# Patient Record
Sex: MALE | Race: WHITE | Employment: OTHER | URBAN - METROPOLITAN AREA
[De-identification: names, ages, dates, MRNs, and addresses within clinical notes are randomized per-mention and may not be internally consistent; named-entity substitution may affect disease eponyms.]

---

## 2019-08-30 ENCOUNTER — TELEPHONE (OUTPATIENT)
Dept: CASE MANAGEMENT | Age: 69
End: 2019-08-30

## 2019-12-19 PROBLEM — R00.1 SINUS BRADYCARDIA: Status: ACTIVE | Noted: 2019-12-19

## 2019-12-19 PROBLEM — M51.36 DDD (DEGENERATIVE DISC DISEASE), LUMBAR: Status: ACTIVE | Noted: 2019-12-19

## 2019-12-19 PROBLEM — E66.09 CLASS 1 OBESITY DUE TO EXCESS CALORIES WITH SERIOUS COMORBIDITY AND BODY MASS INDEX (BMI) OF 32.0 TO 32.9 IN ADULT: Status: ACTIVE | Noted: 2019-12-19

## 2020-11-19 PROBLEM — M79.621 PAIN IN BOTH UPPER ARMS: Status: ACTIVE | Noted: 2020-11-19

## 2020-11-19 PROBLEM — M79.622 PAIN IN BOTH UPPER ARMS: Status: ACTIVE | Noted: 2020-11-19

## 2021-05-24 ENCOUNTER — HOSPITAL ENCOUNTER (OUTPATIENT)
Dept: LAB | Age: 71
Discharge: HOME | End: 2021-05-24

## 2021-05-24 PROCEDURE — 88305 TISSUE EXAM BY PATHOLOGIST: CPT

## 2021-09-15 PROBLEM — I35.0 NONRHEUMATIC AORTIC VALVE STENOSIS: Status: ACTIVE | Noted: 2021-09-15

## 2021-11-23 PROBLEM — M25.511 CHRONIC PAIN OF BOTH SHOULDERS: Status: ACTIVE | Noted: 2021-11-23

## 2021-11-23 PROBLEM — M25.512 CHRONIC PAIN OF BOTH SHOULDERS: Status: ACTIVE | Noted: 2021-11-23

## 2021-11-23 PROBLEM — G89.29 CHRONIC PAIN OF BOTH SHOULDERS: Status: ACTIVE | Noted: 2021-11-23

## 2022-03-14 PROBLEM — F41.8 SITUATIONAL ANXIETY: Status: ACTIVE | Noted: 2022-03-14

## 2022-03-14 PROBLEM — F51.02 ADJUSTMENT INSOMNIA: Status: ACTIVE | Noted: 2022-03-14

## 2022-03-14 PROBLEM — I35.8 NONRHEUMATIC AORTIC VALVE SCLEROSIS: Status: ACTIVE | Noted: 2022-03-14

## 2022-03-14 PROBLEM — F43.9 STRESS: Status: ACTIVE | Noted: 2022-03-14

## 2022-03-14 PROBLEM — D48.5 NEOPLASM OF UNCERTAIN BEHAVIOR OF SKIN OF FACE: Status: ACTIVE | Noted: 2022-03-14

## 2022-03-18 PROBLEM — E66.811 CLASS 1 OBESITY DUE TO EXCESS CALORIES WITH SERIOUS COMORBIDITY AND BODY MASS INDEX (BMI) OF 32.0 TO 32.9 IN ADULT: Status: ACTIVE | Noted: 2019-12-19

## 2022-03-18 PROBLEM — M51.36 DDD (DEGENERATIVE DISC DISEASE), LUMBAR: Status: ACTIVE | Noted: 2019-12-19

## 2022-03-18 PROBLEM — I35.0 NONRHEUMATIC AORTIC VALVE STENOSIS: Status: ACTIVE | Noted: 2021-09-15

## 2022-03-18 PROBLEM — F41.8 SITUATIONAL ANXIETY: Status: ACTIVE | Noted: 2022-03-14

## 2022-03-18 PROBLEM — M51.369 DDD (DEGENERATIVE DISC DISEASE), LUMBAR: Status: ACTIVE | Noted: 2019-12-19

## 2022-03-18 PROBLEM — E66.09 CLASS 1 OBESITY DUE TO EXCESS CALORIES WITH SERIOUS COMORBIDITY AND BODY MASS INDEX (BMI) OF 32.0 TO 32.9 IN ADULT: Status: ACTIVE | Noted: 2019-12-19

## 2022-03-18 PROBLEM — D48.5 NEOPLASM OF UNCERTAIN BEHAVIOR OF SKIN OF FACE: Status: ACTIVE | Noted: 2022-03-14

## 2022-03-19 PROBLEM — F43.9 STRESS: Status: ACTIVE | Noted: 2022-03-14

## 2022-03-19 PROBLEM — R00.1 SINUS BRADYCARDIA: Status: ACTIVE | Noted: 2019-12-19

## 2022-03-19 PROBLEM — M25.511 CHRONIC PAIN OF BOTH SHOULDERS: Status: ACTIVE | Noted: 2021-11-23

## 2022-03-19 PROBLEM — M79.622 PAIN IN BOTH UPPER ARMS: Status: ACTIVE | Noted: 2020-11-19

## 2022-03-19 PROBLEM — G89.29 CHRONIC PAIN OF BOTH SHOULDERS: Status: ACTIVE | Noted: 2021-11-23

## 2022-03-19 PROBLEM — M25.512 CHRONIC PAIN OF BOTH SHOULDERS: Status: ACTIVE | Noted: 2021-11-23

## 2022-03-19 PROBLEM — F51.02 ADJUSTMENT INSOMNIA: Status: ACTIVE | Noted: 2022-03-14

## 2022-03-19 PROBLEM — M79.621 PAIN IN BOTH UPPER ARMS: Status: ACTIVE | Noted: 2020-11-19

## 2022-03-19 PROBLEM — I35.8 NONRHEUMATIC AORTIC VALVE SCLEROSIS: Status: ACTIVE | Noted: 2022-03-14

## 2022-06-14 ENCOUNTER — TELEPHONE (OUTPATIENT)
Dept: FAMILY MEDICINE CLINIC | Facility: CLINIC | Age: 72
End: 2022-06-14

## 2022-06-14 ENCOUNTER — OFFICE VISIT (OUTPATIENT)
Dept: FAMILY MEDICINE CLINIC | Facility: CLINIC | Age: 72
End: 2022-06-14
Payer: MEDICARE

## 2022-06-14 VITALS
OXYGEN SATURATION: 98 % | TEMPERATURE: 97.8 F | BODY MASS INDEX: 33.04 KG/M2 | SYSTOLIC BLOOD PRESSURE: 153 MMHG | HEART RATE: 68 BPM | WEIGHT: 223.1 LBS | DIASTOLIC BLOOD PRESSURE: 77 MMHG | RESPIRATION RATE: 18 BRPM | HEIGHT: 69 IN

## 2022-06-14 DIAGNOSIS — M54.50 ACUTE MIDLINE LOW BACK PAIN WITHOUT SCIATICA: ICD-10-CM

## 2022-06-14 DIAGNOSIS — F43.9 STRESS: ICD-10-CM

## 2022-06-14 DIAGNOSIS — R09.89 LABILE HYPERTENSION: Primary | ICD-10-CM

## 2022-06-14 DIAGNOSIS — I25.10 ATHEROSCLEROSIS OF NATIVE CORONARY ARTERY OF NATIVE HEART WITHOUT ANGINA PECTORIS: ICD-10-CM

## 2022-06-14 DIAGNOSIS — I35.0 NONRHEUMATIC AORTIC VALVE STENOSIS: ICD-10-CM

## 2022-06-14 DIAGNOSIS — F41.8 SITUATIONAL ANXIETY: ICD-10-CM

## 2022-06-14 PROCEDURE — G8417 CALC BMI ABV UP PARAM F/U: HCPCS | Performed by: FAMILY MEDICINE

## 2022-06-14 PROCEDURE — 1123F ACP DISCUSS/DSCN MKR DOCD: CPT | Performed by: FAMILY MEDICINE

## 2022-06-14 PROCEDURE — 3017F COLORECTAL CA SCREEN DOC REV: CPT | Performed by: FAMILY MEDICINE

## 2022-06-14 PROCEDURE — 4004F PT TOBACCO SCREEN RCVD TLK: CPT | Performed by: FAMILY MEDICINE

## 2022-06-14 PROCEDURE — 99214 OFFICE O/P EST MOD 30 MIN: CPT | Performed by: FAMILY MEDICINE

## 2022-06-14 PROCEDURE — G8427 DOCREV CUR MEDS BY ELIG CLIN: HCPCS | Performed by: FAMILY MEDICINE

## 2022-06-14 RX ORDER — ESCITALOPRAM OXALATE 10 MG/1
10 TABLET ORAL DAILY
Qty: 90 TABLET | Refills: 3 | Status: SHIPPED | OUTPATIENT
Start: 2022-06-14

## 2022-06-14 RX ORDER — NEBIVOLOL 20 MG/1
20 TABLET ORAL DAILY
Qty: 90 TABLET | Refills: 3 | Status: SHIPPED | OUTPATIENT
Start: 2022-06-14

## 2022-06-14 RX ORDER — OLMESARTAN MEDOXOMIL 40 MG/1
40 TABLET ORAL DAILY
Qty: 30 TABLET | Refills: 2 | Status: SHIPPED | OUTPATIENT
Start: 2022-06-14 | End: 2022-07-01

## 2022-06-14 NOTE — PROGRESS NOTES
[address] [facility]  [address]  Phone: [phone] Fax [phone]  Christy Ho Khan M.D.  6/14/2022        Quinten Hernandez is a 81 y.o. male     HPI:  Patient is seen for Follow-up (6 weeks)  He had increase his lisinopril to 30 mg and decreased his Bystolic to 10 mg and continued his chlorthalidone at 25 mg daily. Also he had increased his Lexapro to 10 mg daily. Reports blood pressure similar to prior with systolic elevations as previous. Continues to have some significant fluctuations. Does not report any diastolic pressures significantly elevated but also has not had any significant lows. Does not describe symptomatic bradycardia. Does describe recently twisting his lower back for which he has been using some diclofenac gel he received in the past.  Has not taken any ibuprofen 600 mg in several weeks as he has read where this may elevate blood pressure but reportedly it has not seemingly made a difference. Chart review does show where patient has had normal renal function per blood work. He denies any radicular pain into the lower extremities. Patient states that he has noticed that his Lexapro has been beneficial and helping him feel more calm and at ease. Also helps him stay asleep during the night with less frequent awakening other than the 2 times he awakens to urinate during the night. Able to go back to sleep much more quickly. ROS:  Denies any chest pain dyspnea diaphoresis or edema. No reported palpitations or tachycardia. No large weight fluctuations. No abdominal pain. No change in voiding or stooling. Denies any depression.      Allergies   Allergen Reactions    Escitalopram Other (See Comments)     Upset stomach and felt poorly    Valsartan Other (See Comments)     Felt like he was having crazy thoughts and lightheadedness       Active Ambulatory Problems     Diagnosis Date Noted    Nonrheumatic aortic valve stenosis 09/15/2021    Neoplasm of uncertain behavior of skin of face 03/14/2022    History of DVT (deep vein thrombosis) 06/05/2013    DDD (degenerative disc disease), lumbar 12/19/2019    Class 1 obesity due to excess calories with serious comorbidity and body mass index (BMI) of 32.0 to 32.9 in adult 12/19/2019    Situational anxiety 03/14/2022    Colon polyp 01/08/2013    Murmur, cardiac 08/15/2016    Atherosclerosis of native coronary artery of native heart without angina pectoris 08/03/2016    Chronic pain of both shoulders 11/23/2021    Pain in both upper arms 11/19/2020    Sinus bradycardia 12/19/2019    Stress 03/14/2022    Adjustment insomnia 03/14/2022    Nonrheumatic aortic valve sclerosis 03/14/2022    Essential hypertension 01/08/2013    Hyperlipidemia 08/03/2016     Resolved Ambulatory Problems     Diagnosis Date Noted    No Resolved Ambulatory Problems     Past Medical History:   Diagnosis Date    Cardiomyopathy in other disease 1/8/2013    Chest pain, unspecified 8/3/2016    Coronary atherosclerosis of native coronary vessel 8/3/2016    Dyslipidemia 8/3/2016    HTN (hypertension) 1/8/2013    HTN (hypertension), benign 8/3/2016    Hypercholesterolemia 1/8/2013    Pre-diabetes 1/8/2013        Past Surgical History:   Procedure Laterality Date    CARDIAC CATHETERIZATION  9/4/2008    [facility]    CATARACT REMOVAL Right 08/16/2021    [facility]    CATARACT REMOVAL Left 08/30/2021    [facility] COLONOSCOPY  05/24/2021    Dr. Angela Hoffman; colon polyp, diverticulosis, internal hemorrhoid    COLONOSCOPY  7/15/15    Dr. Fiordaliza Garcia; repeat in 5 years    COLONOSCOPY  April 24, 2009    Dr. Saba Lo; every 5 years    VASCULAR SURGERY  2019    venous LE surgery; Dr. Dion Gatica History     Tobacco Use    Smoking status: Never Smoker    Smokeless tobacco: Current User    Tobacco comment: Quit smoking: dips   Substance Use Topics    Alcohol use:  Yes     Alcohol/week: 7.0 standard drinks    Drug use: No       Physical Exam:  Blood pressure (!) 153/77, pulse 68, temperature 97.8 °F (36.6 °C), temperature source Temporal, resp. rate 18, height 5' 9\" (1.753 m), weight 223 lb 1.6 oz (101.2 kg), SpO2 98 %. Pleasant male in no apparent distress. Affect is appropriate. HEENT grossly normal.  Oral mucosa is moist and intact. Lungs clear. Cardiovascular regular S1-S2 with 2/6 to 3/6 systolic murmur in the aortic region without rubs or gallops. Radial pulses 2+. Abdomen is soft, moderately obese, and nontender with positive bowel sounds. Patient does have some lumbar musculature tightness but no palpable midline spinal tenderness. Ambulates without difficulty. No noted pitting peripheral edema or cyanosis. Moves all extremities well. Assessment and Plan:   Diagnosis Orders   1. Labile hypertension  olmesartan (BENICAR) 40 MG tablet    nebivolol (BYSTOLIC) 20 MG TABS tablet   2. Situational anxiety  escitalopram (LEXAPRO) 10 MG tablet   3. Stress  escitalopram (LEXAPRO) 10 MG tablet   4. Acute midline low back pain without sciatica     5. Atherosclerosis of native coronary artery of native heart without angina pectoris     6. Nonrheumatic aortic valve stenosis       Information on hypertension, anxiety, DASH diet, stress, back care basics, low back pain exercises, and acute lumbar pain exercises provided. Discontinue the lisinopril. Even though patient had some difficulties with prior losartan making him feel \"crazy \", I will try patient on olmesartan/Benicar 40 mg daily instead of his lisinopril as patient is currently taking Lexapro and I think this will help better with systolic control. He may increase his Bystolic back to 20 mg daily and continue the chlorthalidone at 25 mg daily. Refilled medications as above. Since he has noticed diclofenac gel being helpful, he may continue to use this but would not take any oral ibuprofen.   May take acetaminophen, up to 2000 mg daily, without adverse concerns. Follow-up with cardiology later this month as planned. Plan reassessment here in 2 months or more quickly as needed. Discharge Meds:  Current Outpatient Medications   Medication Sig Dispense Refill    diclofenac sodium (VOLTAREN) 1 % GEL Apply topically 2 times daily      olmesartan (BENICAR) 40 MG tablet Take 1 tablet by mouth daily 30 tablet 2    escitalopram (LEXAPRO) 10 MG tablet Take 1 tablet by mouth daily 90 tablet 3    nebivolol (BYSTOLIC) 20 MG TABS tablet Take 1 tablet by mouth daily 1 p.o. each night for blood pressure 90 tablet 3    aspirin 81 MG chewable tablet Take 81 mg by mouth daily      atorvastatin (LIPITOR) 20 MG tablet Take 20 mg by mouth daily      chlorthalidone (HYGROTON) 25 MG tablet Take 25 mg by mouth daily      cyanocobalamin 1000 MCG tablet Take 1,000 mcg by mouth daily      folic acid (FOLVITE) 854 MCG tablet Take 800 mcg by mouth daily      ibuprofen (ADVIL;MOTRIN) 600 MG tablet Take 600 mg by mouth every 8 hours as needed       No current facility-administered medications for this visit. Return in about 2 months (around 8/15/2022). Any new medications were explained today including any potential drug interactions or significant side effects. The patient's questions in regards to this were answered today. Dictated using voice recognition software.   Proofread, but unrecognized errors may exist.

## 2022-06-14 NOTE — PATIENT INSTRUCTIONS
Patient Education        Learning About Anxiety Disorders  What are anxiety disorders? Anxiety disorders are a type of medical problem. They cause severe anxiety. When you feel anxious, you feel that something bad is about to happen. Thisfeeling interferes with your life. These disorders include:   Generalized anxiety disorder. You feel worried and stressed about many everyday events and activities. This goes on for several months and disrupts your life on most days.  Panic disorder. You have repeated panic attacks. A panic attack is a sudden, intense fear or anxiety. It may make you feel short of breath. Your heart may pound.  Social anxiety disorder. You feel very anxious about what you will say or do in front of people. For example, you may be scared to talk or eat in public. This problem affects your daily life.  Phobias. You are very scared of a specific object, situation, or activity. For example, you may fear spiders, high places, or small spaces. What are the symptoms? Generalized anxiety disorder  Symptoms may include:   Feeling worried and stressed about many things almost every day.  Feeling tired or irritable. You may have a hard time concentrating.  Having headaches or muscle aches.  Having a hard time getting to sleep or staying asleep. Panic disorder  You may have repeated panic attacks when there is no reason for feeling afraid. You may change your daily activities because you worry that you will haveanother attack. Symptoms may include:   Intense fear, terror, or anxiety.  Trouble breathing or very fast breathing.  Chest pain or tightness.  A heartbeat that races or is not regular. Social anxiety disorder  Symptoms may include:   Fear about a social situation, such as eating in front of others or speaking in public. You may worry a lot. Or you may be afraid that something bad will happen.  Anxiety that can cause you to blush, sweat, and feel shaky.    A heartbeat that is faster than normal.   A hard time focusing. Phobias  Symptoms may include:   More fear than most people of being around an object, being in a situation, or doing an activity. You might also be stressed about the chance of being around the thing you fear.  Worry about losing control, panicking, fainting, or having physical symptoms like a faster heartbeat when you are around the situation or object. How are these disorders treated? Anxiety disorders can be treated with medicines or counseling. A combination ofboth may be used. Medicines may include:   Antidepressants. These may help your symptoms by keeping chemicals in your brain in balance.  Benzodiazepines. These may give you short-term relief of your symptoms. Some people use cognitive-behavioral therapy. A therapist helps you learn tochange stressful or bad thoughts into helpful thoughts. Lead a healthy lifestyle  A healthy lifestyle may help you feel better.  Get at least 30 minutes of exercise on most days of the week. Walking is a good choice.  Eat a healthy diet. Include fruits, vegetables, lean proteins, and whole grains in your diet each day.  Try to go to bed at the same time every night. Try for 8 hours of sleep a night.  Find ways to manage stress. Try relaxation exercises.  Avoid alcohol and illegal drugs. Follow-up care is a key part of your treatment and safety. Be sure to make and go to all appointments, and call your doctor if you are having problems. It's also a good idea to know your test results and keep alist of the medicines you take. Where can you learn more? Go to https://enrique.[employer]ostics. org and sign in to your [employer] account. Enter P997 in the [facility] box to learn more about \"Learning About Anxiety Disorders. \"     If you do not have an account, please click on the \"Sign Up Now\" link.   Current as of: June 16, 2021               Content Version: 13.2  © 5917-7868 Healthwise, Incorporated. Care instructions adapted under license by [facility] ([facility] [place]). If you have questions about a medical condition or this instruction, always ask your healthcare professional. [place] [address] any warranty or liability for your use of this information. Patient Education        Learning About How to Have a Healthy Back  What causes back pain? Back pain is often caused by overuse, strain, or injury. For example, people often hurt their backs playing sports or working in the yard, being jolted in acar accident, or lifting something too heavy. Aging plays a part too. Your bones and muscles tend to lose strength as you age, which makes injury more likely. The spongy discs between the bones of the spine (vertebrae) may suffer from wear and tear and no longer provide enough cushion between the bones. A disc that bulges or breaks open (herniated disc)can press on nerves, causing back pain. In some people, back pain is the result of arthritis, broken vertebrae causedby bone loss (osteoporosis), illness, or a spine problem. Although most people have back pain at one time or another, there are steps youcan take to make it less likely. How can you have a healthy back? Reduce stress on your back through good posture   Slumping or slouching alone may not cause low back pain. But after the back hasbeen strained or injured, bad posture can make pain worse.  Sleep in a position that maintains your back's normal curves and on a mattress that feels comfortable. Sleep on your side with a pillow between your knees, or sleep on your back with a pillow under your knees. These positions can reduce strain on your back.  Stand and sit up straight. \"Good posture\" generally means your ears, shoulders, and hips are in a straight line.  If you must stand for a long time, put one foot on a stool, ledge, or box. Switch feet every now and then.    Sit in a chair that is low enough to let you place both feet flat on the floor with both knees nearly level with your hips. If your chair or desk is too high, use a footrest to raise your knees. Place a small pillow, a rolled-up towel, or a lumbar roll in the curve of your back if you need extra support.  Try a kneeling chair, which helps tilt your hips forward. This takes pressure off your lower back.  Try sitting on an exercise ball. It can rock from side to side, which helps keep your back loose.  When driving, keep your knees nearly level with your hips. Sit straight, and drive with both hands on the steering wheel. Your arms should be in a slightly bent position. Reduce stress on your back through careful lifting    Squat down, bending at the hips and knees only. If you need to, put one knee to the floor and extend your other knee in front of you, bent at a right angle (half kneeling).  Press your chest straight forward. This helps keep your upper back straight while keeping a slight arch in your low back.  Hold the load as close to your body as possible, at the level of your belly button (navel).  Use your feet to change direction, taking small steps.  Lead with your hips as you change direction. Keep your shoulders in line with your hips as you move.  Set down your load carefully, squatting with your knees and hips only. Exercise and stretch your back    Do some exercise on most days of the week, if your doctor says it is okay. You can walk, run, swim, or cycle.  Stretch your back muscles. Here are a few exercises to try:  ? Lie on your back, and gently pull one bent knee to your chest. Put that foot back on the floor, and then pull the other knee to your chest.  ? Do pelvic tilts. Lie on your back with your knees bent. Tighten your stomach muscles. Pull your belly button (navel) in and up toward your ribs. You should feel like your back is pressing to the floor and your hips and pelvis are slightly lifting off the floor.  Hold for 6 seconds while breathing smoothly. ? Sit with your back flat against a wall.  Keep your core muscles strong. The muscles of your back, belly (abdomen), and buttocks support your spine. ? Pull in your belly and imagine pulling your navel toward your spine. Hold this for 6 seconds, then relax. Remember to keep breathing normally as you tense your muscles. ? Do curl-ups. Always do them with your knees bent. Keep your low back on the floor, and curl your shoulders toward your knees using a smooth, slow motion. Keep your arms folded across your chest. If this bothers your neck, try putting your hands behind your neck (not your head), with your elbows spread apart. ? Lie on your back with your knees bent and your feet flat on the floor. Tighten your belly muscles, and then push with your feet and raise your buttocks up a few inches. Hold this position 6 seconds as you continue to breathe normally, then lower yourself slowly to the floor. Repeat 8 to 12 times. ? If you like group exercise, try Pilates or yoga. These classes have poses that strengthen the core muscles. Lead a healthy lifestyle    Stay at a healthy weight to avoid strain on your back.  Do not smoke. Smoking increases the risk of osteoporosis, which weakens the spine. If you need help quitting, talk to your doctor about stop-smoking programs and medicines. These can increase your chances of quitting for good. Where can you learn more? Go to https://"[employer]"cy.Recovers. org and sign in to your [employer] account. Enter L315 in the [employer] box to learn more about \"Learning About How to Have a Healthy Back. \"     If you do not have an account, please click on the \"Sign Up Now\" link. Current as of: July 1, 2021               Content Version: 13.2  © 2006-2022 Healthwise, Incorporated. Care instructions adapted under license by [facility] ([facility]).  If you have questions about a medical condition or this instruction, always ask your and lower the knee to the starting position. 5. Repeat with the other leg. Repeat 2 to 4 times with each leg. 6. To get more stretch, put your other leg flat on the floor while pulling your knee to your chest.  Curl-ups    1. Lie on the floor on your back with your knees bent at a 90-degree angle. Your feet should be flat on the floor, about 12 inches from your buttocks. 2. Cross your arms over your chest. If this bothers your neck, try putting your hands behind your neck (not your head), with your elbows spread apart. 3. Slowly tighten your belly muscles and raise your shoulder blades off the floor. 4. Keep your head in line with your body, and do not press your chin to your chest.  5. Hold this position for 1 or 2 seconds, then slowly lower yourself back down to the floor. 6. Repeat 8 to 12 times. Pelvic tilt exercise    1. Lie on your back with your knees bent. 2. \"Brace\" your stomach. This means to tighten your muscles by pulling in and imagining your belly button moving toward your spine. You should feel like your back is pressing to the floor and your hips and pelvis are rocking back. 3. Hold for about 6 seconds while you breathe smoothly. 4. Repeat 8 to 12 times. Heel dig bridging    1. Lie on your back with both knees bent and your ankles bent so that only your heels are digging into the floor. Your knees should be bent about 90 degrees. 2. Then push your heels into the floor, squeeze your buttocks, and lift your hips off the floor until your shoulders, hips, and knees are all in a straight line. 3. Hold for about 6 seconds as you continue to breathe normally, and then slowly lower your hips back down to the floor and rest for up to 10 seconds. 4. Do 8 to 12 repetitions. Hamstring stretch in doorway    1. Lie on your back in a doorway, with one leg through the open door. 2. Slide your leg up the wall to straighten your knee. You should feel a gentle stretch down the back of your leg.   3. Hold the stretch for at least 15 to 30 seconds. Do not arch your back, point your toes, or bend either knee. Keep one heel touching the floor and the other heel touching the wall. 4. Repeat with your other leg. 5. Do 2 to 4 times for each leg. Hip flexor stretch    1. Kneel on the floor with one knee bent and one leg behind you. Place your forward knee over your foot. Keep your other knee touching the floor. 2. Slowly push your hips forward until you feel a stretch in the upper thigh of your rear leg. 3. Hold the stretch for at least 15 to 30 seconds. Repeat with your other leg. 4. Do 2 to 4 times on each side. Wall sit    1. Stand with your back 10 to 12 inches away from a wall. 2. Lean into the wall until your back is flat against it. 3. Slowly slide down until your knees are slightly bent, pressing your lower back into the wall. 4. Hold for about 6 seconds, then slide back up the wall. 5. Repeat 8 to 12 times. Follow-up care is a key part of your treatment and safety. Be sure to make and go to all appointments, and call your doctor if you are having problems. It's also a good idea to know your test results and keep alist of the medicines you take. Where can you learn more? Go to https://[employer].WEISSENHAUS. org and sign in to your Slack account. Enter Y950 in the [facility] box to learn more about \"Low Back Pain: Exercises. \"     If you do not have an account, please click on the \"Sign Up Now\" link. Current as of: July 1, 2021               Content Version: 13.2  © 6061-2365 Healthwise, Incorporated. Care instructions adapted under license by [facility] ([facility]). If you have questions about a medical condition or this instruction, always ask your healthcare professional. [place] [address] any warranty or liability for your use of this information.          Patient Education        Acute Low Back Pain: Exercises  Introduction  Here are some examples of typical rehabilitation exercises for your condition. Start each exercise slowly. Ease off the exercise if you start to have pain. Your doctor or physical therapist will tell you when you can start theseexercises and which ones will work best for you. When you are not being active, find a comfortable position for rest. Some people are comfortable on the floor or a medium-firm bed with a small pillow under their head and another under their knees. Some people prefer to lie on their side with a pillow between their knees. Don't stay in one position fortoo long. Take short walks (10 to 20 minutes) every 2 to 3 hours. Avoid slopes, hills, and stairs until you feel better. Walk only distances you can manage withoutpain, especially leg pain. How to do the exercises  Back stretches    1. Get down on your hands and knees on the floor. 2. Relax your head and allow it to droop. Round your back up toward the ceiling until you feel a nice stretch in your upper, middle, and lower back. Hold this stretch for as long as it feels comfortable, or about 15 to 30 seconds. 3. Return to the starting position with a flat back while you are on your hands and knees. 4. Let your back sway by pressing your stomach toward the floor. Lift your buttocks toward the ceiling. 5. Hold this position for 15 to 30 seconds. 6. Repeat 2 to 4 times. Follow-up care is a key part of your treatment and safety. Be sure to make and go to all appointments, and call your doctor if you are having problems. It's also a good idea to know your test results and keep alist of the medicines you take. Where can you learn more? Go to https://[employer].Echolocation. org and sign in to your [employer] account. Enter S513 in the [employer] box to learn more about \"Acute Low Back Pain: Exercises. \"     If you do not have an account, please click on the \"Sign Up Now\" link.   Current as of: September 8, 2021               Content Version: 13.2  © 0857-8092 Healthwise, Incorporated. Care instructions adapted under license by [facility] ([facility]). If you have questions about a medical condition or this instruction, always ask your healthcare professional. [address] any warranty or liability for your use of this information. Patient Education        Learning About Stress  What is stress? Stress is what you feel when you have to handle more than you are used to. Stress is a fact of life for most people, and it affects everyone differently. What causes stress for you may not be stressful for someone else. A lot of things can cause stress. You may feel stress when you go on a job interview, take a test, or run a race. This kind of short-term stress is normal and even useful. It can help you if you need to work hard or react quickly. Forexample, stress can help you finish an important job on time. Stress also can last a long time. Long-term stress is caused by stressful situations or events. Examples of long-term stress include long-term health problems, ongoing problems at work, or conflicts in your family. Long-termstress can harm your health. How does stress affect your health? When you are stressed, your body responds as though you are in danger. It makes hormones that speed up your heart, make you breathe faster, and give you a burst of energy. This is called the fight-or-flight stress response. If thestress is over quickly, your body goes back to normal and no harm is done. But if stress happens too often or lasts too long, it can have bad effects. Long-term stress can make you more likely to get sick, and it can make symptoms of some diseases worse. If you tense up when you are stressed, you may develop neck, shoulder, or low back pain. Stress is linked to high blood pressure andheart disease. Stress also harms your emotional health. It can make you carbajal, tense, or depressed.  Your relationships may suffer, and you may not do well at work [employer]. What can you do to manage stress? How to relax your mind    Write. It may help to write about things that are bothering you. This helps you find out how much stress you feel and what is causing it. When you know this, you can find better ways to cope.  Let your feelings out. Talk, laugh, cry, and express anger when you need to. Talking with friends, family, a counselor, or a member of the clergy about your feelings is a healthy way to relieve stress.  Do something you enjoy. For example, listen to music or go to a movie. Practice your hobby or do volunteer work.  Meditate. This can help you relax, because you are not worrying about what happened before or what may happen in the future.  Do guided imagery. Imagine yourself in any setting that helps you feel calm. You can use audiotapes, books, or a teacher to guide you. How to relax your body    Do something active. Exercise or activity can help reduce stress. Walking is a great way to get started. Even everyday activities such as housecleaning or yard work can help.  Do breathing exercises. For example:  ? From a standing position, bend forward from the waist with your knees slightly bent. Let your arms dangle close to the floor. ? Breathe in slowly and deeply as you return to a standing position. Roll up slowly and lift your head last.  ? Hold your breath for just a few seconds in the standing position. ? Breathe out slowly and bend forward from the waist.   Try yoga or mabel chi. These techniques combine exercise and meditation. You may need some training at first to learn them. What can you do to prevent stress?  Manage your time. This helps you find time to do the things you want and need to do.  Get enough sleep. Your body recovers from the stresses of the day while you are sleeping.  Get support. Your family, friends, and community can make a difference in how you experience stress. Where can you learn more?   Go to https://chpepiceweb.Audioms. org and sign in to your "[employer] account. Enter W344 in the [employer] box to learn more about \"Learning About Stress. \"     If you do not have an account, please click on the \"Sign Up Now\" link. Current as of: June 16, 2021               Content Version: 13.2  © 2006-2022 [employer]. Care instructions adapted under license by [facility] ([facility]). If you have questions about a medical condition or this instruction, always ask your healthcare professional. [place] [address] any warranty or liability for your use of this information. Patient Education        DASH Diet: Care Instructions  Your Care Instructions     The DASH diet is an eating plan that can help lower your blood pressure. DASH stands for Dietary Approaches to Stop Hypertension. Hypertension is high bloodpressure. The DASH diet focuses on eating foods that are high in calcium, potassium, and magnesium. These nutrients can lower blood pressure. The foods that are highest in these nutrients are fruits, vegetables, low-fat dairy products, nuts, seeds, and legumes. But taking calcium, potassium, and magnesium supplements instead of eating foods that are high in those nutrients does not have the same effect. The DASH diet also includes whole grains, fish, and poultry. The DASH diet is one of several lifestyle changes your doctor may recommend to lower your high blood pressure. Your doctor may also want you to decrease the amount of sodium in your diet. Lowering sodium while following the DASH dietcan lower blood pressure even further than just the DASH diet alone. Follow-up care is a key part of your treatment and safety. Be sure to make and go to all appointments, and call your doctor if you are having problems. It's also a good idea to know your test results and keep alist of the medicines you take. How can you care for yourself at home?   Following the DASH diet   Eat 4 to 5 servings of fruit each day. A serving is 1 medium-sized piece of fruit, ½ cup chopped or canned fruit, 1/4 cup dried fruit, or 4 ounces (½ cup) of fruit juice. Choose fruit more often than fruit juice.  Eat 4 to 5 servings of vegetables each day. A serving is 1 cup of lettuce or raw leafy vegetables, ½ cup of chopped or cooked vegetables, or 4 ounces (½ cup) of vegetable juice. Choose vegetables more often than vegetable juice.  Get 2 to 3 servings of low-fat and fat-free dairy each day. A serving is 8 ounces of milk, 1 cup of yogurt, or 1 ½ ounces of cheese.  Eat 6 to 8 servings of grains each day. A serving is 1 slice of bread, 1 ounce of dry cereal, or ½ cup of cooked rice, pasta, or cooked cereal. Try to choose whole-grain products as much as possible.  Limit lean meat, poultry, and fish to 2 servings each day. A serving is 3 ounces, about the size of a deck of cards.  Eat 4 to 5 servings of nuts, seeds, and legumes (cooked dried beans, lentils, and split peas) each week. A serving is 1/3 cup of nuts, 2 tablespoons of seeds, or ½ cup of cooked beans or peas.  Limit fats and oils to 2 to 3 servings each day. A serving is 1 teaspoon of vegetable oil or 2 tablespoons of salad dressing.  Limit sweets and added sugars to 5 servings or less a week. A serving is 1 tablespoon jelly or jam, ½ cup sorbet, or 1 cup of lemonade.  Eat less than 2,300 milligrams (mg) of sodium a day. If you limit your sodium to 1,500 mg a day, you can lower your blood pressure even more.  Be aware that all of these are the suggested number of servings for people who eat 1,800 to 2,000 calories a day. Your recommended number of servings may be different if you need more or fewer calories. Tips for success   Start small. Do not try to make dramatic changes to your diet all at once. You might feel that you are missing out on your favorite foods and then be more likely to not follow the plan.  Make small changes, and stick with them. Once those changes become habit, add a few more changes.  Try some of the following:  ? Make it a goal to eat a fruit or vegetable at every meal and at snacks. This will make it easy to get the recommended amount of fruits and vegetables each day. ? Try yogurt topped with fruit and nuts for a snack or healthy dessert. ? Add lettuce, tomato, cucumber, and onion to sandwiches. ? Combine a ready-made pizza crust with low-fat mozzarella cheese and lots of vegetable toppings. Try using tomatoes, squash, spinach, broccoli, carrots, cauliflower, and onions. ? Have a variety of cut-up vegetables with a low-fat dip as an appetizer instead of chips and dip. ? Sprinkle sunflower seeds or chopped almonds over salads. Or try adding chopped walnuts or almonds to cooked vegetables. ? Try some vegetarian meals using beans and peas. Add garbanzo or kidney beans to salads. Make burritos and tacos with mashed reaves beans or black beans. Where can you learn more? Go to https://HumanCloudpeSnakk Media.HubHuman. org and sign in to your [employer] account. Enter U165 in the [facility] box to learn more about \"DASH Diet: Care Instructions. \"     If you do not have an account, please click on the \"Sign Up Now\" link. Current as of: January 10, 2022               Content Version: 13.2  © 2006-2022 365net. Care instructions adapted under license by [facility] ([facility]). If you have questions about a medical condition or this instruction, always ask your healthcare professional. [place] [address] any warranty or liability for your use of this information. Patient Education         High Blood Pressure: The DASH Diet (02:03)  Your health professional recommends that you watch this short online healthvideo. Learn how the DASH eating plan can help lower your blood pressure. Purpose:  Outlines how the DASH diet helps hypertension.  Gives food and meal suggestionsto get patients started. Goal:  The user will learn how the DASH eating plan can help lower blood pressure. How to watch the video    Scan the QR code   OR Visit the website    [email or web address]   Current as of: October 6, 2021               Content Version: 13.2  © 2006-2022 Healthwise, [employer]. Care instructions adapted under license by [facility] ([facility]). If you have questions about a medical condition or this instruction, always ask your healthcare professional. [place] [address] any warranty or liability for your use of this information. Patient Education        Learning About High Blood Pressure  What is high blood pressure? Blood pressure is a measure of how hard the blood pushes against the walls of your arteries. It's normal for blood pressure to go up and down throughout the day. But if it stays up, you have high blood pressure. Another name for highblood pressure is hypertension. Two numbers tell you your blood pressure. The first number is the systolic pressure (top number). It shows how hard the blood pushes when your heart is pumping. The second number is the diastolic pressure (bottom number). It shows how hard the blood pushes between heartbeats, when your heart is relaxed andfilling with blood. Your doctor will give you a goal for your blood pressure based on your health and your age. High blood pressure (hypertension) means that the top numberstays high, or the bottom number stays high, or both. High blood pressure increases the risk of stroke, heart attack, and otherproblems. What happens when you have high blood pressure?  Blood flows through your arteries with too much force. Over time, this can damage the heart and the walls of your arteries. But you can't feel it. High blood pressure usually doesn't cause symptoms.  High blood pressure makes your heart work harder.  And that can lead to heart failure, which means your heart doesn't pump as much blood as your body needs.  Fat and calcium start to build up in your arteries. This buildup is called hardening of the arteries. It can cause many problems including a heart attack and stroke.  Arteries also carry blood and oxygen to organs like your eyes, kidneys, and brain. If high blood pressure damages those arteries, it can lead to vision loss, kidney disease, stroke, and a higher risk of dementia. How can you prevent high blood pressure?  Stay at a healthy weight.  Try to limit how much sodium you eat to less than 2,300 milligrams (mg) a day. If you limit your sodium to 1,500 mg a day, you can lower your blood pressure even more. ? Buy foods that are labeled \"unsalted,\" \"sodium-free,\" or \"low-sodium. \" Foods labeled \"reduced-sodium\" and \"light sodium\" may still have too much sodium. ? Flavor your food with garlic, lemon juice, onion, vinegar, herbs, and spices instead of salt. Do not use soy sauce, steak sauce, onion salt, garlic salt, mustard, or ketchup on your food. ? Use less salt (or none) when recipes call for it. You can often use half the salt a recipe calls for without losing flavor.  Be physically active. Get at least 30 minutes of exercise on most days of the week. Walking is a good choice. You also may want to do other activities, such as running, swimming, cycling, or playing tennis or team sports.  Limit alcohol to 2 drinks a day for men and 1 drink a day for women.  Eat plenty of fruits, vegetables, and low-fat dairy products. Eat less saturated and total fats. How is high blood pressure treated?  Your doctor will suggest making lifestyle changes to help your heart. For example, your doctor may ask you to eat healthy foods, quit smoking, lose extra weight, and be more active.  If lifestyle changes don't help enough, your doctor may recommend that you take medicine.  When blood pressure is very high, medicines are needed to lower it.   Follow-up care is a key part of your treatment and safety. Be sure to make and go to all appointments, and call your doctor if you are having problems. It's also a good idea to know your test results and keep alist of the medicines you take. Where can you learn more? Go to https://enrique.[email or web address] org and sign in to your [employer] account. Enter P501 in the [employer] box to learn more about \"Learning About High Blood Pressure. \"     If you do not have an account, please click on the \"Sign Up Now\" link. Current as of: January 10, 2022               Content Version: 13.2  © 3202-4993 Healthwise, Incorporated. Care instructions adapted under license by [facility] ([facility]). If you have questions about a medical condition or this instruction, always ask your healthcare professional. [address] any warranty or liability for your use of this information.

## 2022-07-01 ENCOUNTER — OFFICE VISIT (OUTPATIENT)
Dept: CARDIOLOGY CLINIC | Age: 72
End: 2022-07-01
Payer: MEDICARE

## 2022-07-01 VITALS
DIASTOLIC BLOOD PRESSURE: 78 MMHG | SYSTOLIC BLOOD PRESSURE: 130 MMHG | HEIGHT: 69 IN | WEIGHT: 215 LBS | HEART RATE: 58 BPM | BODY MASS INDEX: 31.84 KG/M2

## 2022-07-01 DIAGNOSIS — I25.10 ATHEROSCLEROSIS OF NATIVE CORONARY ARTERY OF NATIVE HEART WITHOUT ANGINA PECTORIS: ICD-10-CM

## 2022-07-01 DIAGNOSIS — I35.0 NONRHEUMATIC AORTIC VALVE STENOSIS: Primary | ICD-10-CM

## 2022-07-01 DIAGNOSIS — E78.00 PURE HYPERCHOLESTEROLEMIA: ICD-10-CM

## 2022-07-01 DIAGNOSIS — I10 ESSENTIAL HYPERTENSION: ICD-10-CM

## 2022-07-01 PROCEDURE — G8427 DOCREV CUR MEDS BY ELIG CLIN: HCPCS | Performed by: INTERNAL MEDICINE

## 2022-07-01 PROCEDURE — 3017F COLORECTAL CA SCREEN DOC REV: CPT | Performed by: INTERNAL MEDICINE

## 2022-07-01 PROCEDURE — 1123F ACP DISCUSS/DSCN MKR DOCD: CPT | Performed by: INTERNAL MEDICINE

## 2022-07-01 PROCEDURE — G8417 CALC BMI ABV UP PARAM F/U: HCPCS | Performed by: INTERNAL MEDICINE

## 2022-07-01 PROCEDURE — 99214 OFFICE O/P EST MOD 30 MIN: CPT | Performed by: INTERNAL MEDICINE

## 2022-07-01 PROCEDURE — 4004F PT TOBACCO SCREEN RCVD TLK: CPT | Performed by: INTERNAL MEDICINE

## 2022-07-01 RX ORDER — ATORVASTATIN CALCIUM 40 MG/1
40 TABLET, FILM COATED ORAL DAILY
Qty: 90 TABLET | Refills: 3 | Status: SHIPPED | OUTPATIENT
Start: 2022-07-01

## 2022-07-01 RX ORDER — CHLORAL HYDRATE 500 MG
3000 CAPSULE ORAL 4 TIMES DAILY
COMMUNITY

## 2022-07-01 ASSESSMENT — ENCOUNTER SYMPTOMS
ABDOMINAL PAIN: 0
SHORTNESS OF BREATH: 0

## 2022-07-01 NOTE — PROGRESS NOTES
[address], [address] [employer] [facility], [address] [place] [address]  PHONE: [phone]    Laine Prescott  1950      SUBJECTIVE:   Laine Prescott is a 81 y.o. male seen for a follow up visit regarding the following:     Chief Complaint   Patient presents with    Coronary Artery Disease     strecho results       HPI:    81-year-old male comes back for follow-up of a history of some mild early coronary calcium and some mild aortic sclerosis on echo we had him come back and do a stress echo. He was able to exercise stage II heart rate over 95%. He had no chest pain or ST changes. Apparently he was some delay in getting from the treadmill test to the echo but time his images were noted but overall function was normal.  That limits a little bit of the echo but he has had no symptoms. Has had some mild aortic sclerosis with minimal gradient. He has had no complaints of chest pain. Past Medical History, Past Surgical History, Family history, Social History, and Medications were all reviewed with the patient today and updated as necessary. Allergies   Allergen Reactions    Escitalopram Other (See Comments)     Upset stomach and felt poorly    Valsartan Other (See Comments)     Felt like he was having crazy thoughts and lightheadedness     Past Medical History:   Diagnosis Date    Cardiomyopathy in other disease 1/8/2013    Chest pain, unspecified 8/3/2016    Colon polyp 1/8/2013    Coronary atherosclerosis of native coronary vessel 8/3/2016    Dyslipidemia 8/3/2016    Essential hypertension 1/8/2013    HTN (hypertension) 1/8/2013    HTN (hypertension), benign 8/3/2016    The blood pressure readings have been in the target range. The patient is being seen by a primary care physician. No complications noted from the medication presently being used.  Hypercholesterolemia 1/8/2013    Hyperlipidemia 8/3/2016       The patient's hyperlipidemia is stable. Recent laboratory work satisfactory.  The patient is being seen by another professional.The patient is using statin drugs and is tolerating this well.  Pre-diabetes 1/8/2013     Past Surgical History:   Procedure Laterality Date    [facility]ATION  9/4/2008    [facility]    CATARACT REMOVAL Right 08/16/2021    Jennifer Presley CATARACT REMOVAL Left 08/30/2021    Jennifer Presley COLONOSCOPY  05/24/2021    Dr. Omar Terry; colon polyp, diverticulosis, internal hemorrhoid    COLONOSCOPY  7/15/15    Dr. Daija Cuello; repeat in 5 years    COLONOSCOPY  April 24, 2009    Dr. Maria G Lancaster; every 5 years    VASCULAR SURGERY  2019    venous LE surgery; Dr. Daysi Saucedo     Family History   Problem Relation Age of Onset    Heart Disease Father 45    Stroke Neg Hx     Heart Surgery Brother 62    Cancer Brother 79        colon cancer    Diabetes Brother     Diabetes Sister     Diabetes Mother       Social History     Tobacco Use    Smoking status: Never Smoker    Smokeless tobacco: Current User    Tobacco comment: Quit smoking: dips   Substance Use Topics    Alcohol use: Yes     Alcohol/week: 7.0 standard drinks       ROS:    Review of Systems   Constitutional: Negative for decreased appetite. Cardiovascular: Negative for chest pain, dyspnea on exertion, irregular heartbeat, leg swelling, near-syncope, palpitations and syncope. Respiratory: Negative for shortness of breath. Hematologic/Lymphatic: Negative for bleeding problem. Gastrointestinal: Negative for abdominal pain. PHYSICAL EXAM:    /78   Pulse 58   Ht 5' 9\" (1.753 m)   Wt 215 lb (97.5 kg)   BMI 31.75 kg/m²        Wt Readings from Last 3 Encounters:   07/01/22 215 lb (97.5 kg)   06/14/22 223 lb 1.6 oz (101.2 kg)   03/14/22 222 lb 3.2 oz (100.8 kg)     BP Readings from Last 3 Encounters:   07/01/22 130/78   06/14/22 (!) 153/77   03/14/22 (!) 160/73         Physical Exam  Constitutional:       General: He is not in acute distress. Cardiovascular:      Rate and Rhythm: Normal rate and regular rhythm. Heart sounds: Murmur heard. No gallop. Pulmonary:      Effort: Pulmonary effort is normal.      Breath sounds: No wheezing. Abdominal:      Tenderness: There is no abdominal tenderness. Musculoskeletal:         General: No swelling. Neurological:      General: No focal deficit present. Mental Status: He is alert. Medical problems and test results were reviewed with the patient today. No results found for any visits on 07/01/22. Lab Results   Component Value Date/Time     11/23/2021 08:52 AM    K 4.2 11/23/2021 08:52 AM     11/23/2021 08:52 AM    CO2 23 11/23/2021 08:52 AM    BUN 19 11/23/2021 08:52 AM    GFRAA 83 11/23/2021 08:52 AM     Lab Results   Component Value Date/Time    CHOL 162 11/23/2021 08:52 AM    HDL 54 11/23/2021 08:52 AM    VLDL 13 11/23/2021 08:52 AM         ASSESSMENT and PLAN    Anjana was seen today for coronary artery disease. Diagnoses and all orders for this visit:    Nonrheumatic aortic valve stenosis mild on echo I went back over that with him we will need to continue to follow-up. Essential hypertensionCurrently stable on his Bystolic    Atherosclerosis of native coronary artery of native heart without angina pectoris    Pure hypercholesterolemia LDL level still not quite at goal increase Lipitor back to 40/day check lipids in a month we will check the lipid profile again.  -     Lipid Panel; Future    Other orders  -     atorvastatin (LIPITOR) 40 MG tablet; Take 1 tablet by mouth daily        [unfilled]      No follow-up provider specified.     Topher Weathers MD  7/1/2022  12:34 PM

## 2022-08-01 DIAGNOSIS — I10 ESSENTIAL (PRIMARY) HYPERTENSION: ICD-10-CM

## 2022-08-01 RX ORDER — NEBIVOLOL 10 MG/1
TABLET ORAL
Qty: 90 TABLET | Refills: 0 | OUTPATIENT
Start: 2022-08-01

## 2022-08-01 RX ORDER — LISINOPRIL 30 MG/1
TABLET ORAL
Qty: 90 TABLET | Refills: 0 | Status: SHIPPED | OUTPATIENT
Start: 2022-08-01 | End: 2022-08-15

## 2022-08-15 ENCOUNTER — OFFICE VISIT (OUTPATIENT)
Dept: FAMILY MEDICINE CLINIC | Facility: CLINIC | Age: 72
End: 2022-08-15
Payer: MEDICARE

## 2022-08-15 VITALS
HEIGHT: 69 IN | TEMPERATURE: 98.4 F | OXYGEN SATURATION: 99 % | BODY MASS INDEX: 33.84 KG/M2 | DIASTOLIC BLOOD PRESSURE: 90 MMHG | RESPIRATION RATE: 18 BRPM | WEIGHT: 228.5 LBS | SYSTOLIC BLOOD PRESSURE: 164 MMHG | HEART RATE: 68 BPM

## 2022-08-15 DIAGNOSIS — E66.09 CLASS 1 OBESITY DUE TO EXCESS CALORIES WITH SERIOUS COMORBIDITY AND BODY MASS INDEX (BMI) OF 33.0 TO 33.9 IN ADULT: ICD-10-CM

## 2022-08-15 DIAGNOSIS — F51.02 ADJUSTMENT INSOMNIA: ICD-10-CM

## 2022-08-15 DIAGNOSIS — F41.8 SITUATIONAL ANXIETY: ICD-10-CM

## 2022-08-15 DIAGNOSIS — E78.00 PURE HYPERCHOLESTEROLEMIA: ICD-10-CM

## 2022-08-15 DIAGNOSIS — I35.8 NONRHEUMATIC AORTIC VALVE SCLEROSIS: ICD-10-CM

## 2022-08-15 DIAGNOSIS — I25.10 ATHEROSCLEROSIS OF NATIVE CORONARY ARTERY OF NATIVE HEART WITHOUT ANGINA PECTORIS: ICD-10-CM

## 2022-08-15 DIAGNOSIS — I10 UNCONTROLLED HYPERTENSION: Primary | ICD-10-CM

## 2022-08-15 PROCEDURE — 99214 OFFICE O/P EST MOD 30 MIN: CPT | Performed by: FAMILY MEDICINE

## 2022-08-15 PROCEDURE — G8427 DOCREV CUR MEDS BY ELIG CLIN: HCPCS | Performed by: FAMILY MEDICINE

## 2022-08-15 PROCEDURE — G8417 CALC BMI ABV UP PARAM F/U: HCPCS | Performed by: FAMILY MEDICINE

## 2022-08-15 PROCEDURE — 4004F PT TOBACCO SCREEN RCVD TLK: CPT | Performed by: FAMILY MEDICINE

## 2022-08-15 PROCEDURE — 1123F ACP DISCUSS/DSCN MKR DOCD: CPT | Performed by: FAMILY MEDICINE

## 2022-08-15 PROCEDURE — 3017F COLORECTAL CA SCREEN DOC REV: CPT | Performed by: FAMILY MEDICINE

## 2022-08-15 RX ORDER — AZILSARTAN KAMEDOXOMIL 80 MG/1
1 TABLET ORAL DAILY
Qty: 30 TABLET | Refills: 0
Start: 2022-08-15 | End: 2022-09-29 | Stop reason: SDUPTHER

## 2022-08-15 RX ORDER — OLMESARTAN MEDOXOMIL 40 MG/1
40 TABLET ORAL DAILY
COMMUNITY
End: 2022-08-15 | Stop reason: ALTCHOICE

## 2022-08-15 RX ORDER — LISINOPRIL 30 MG/1
TABLET ORAL
Qty: 90 TABLET | Refills: 0 | Status: CANCELLED | OUTPATIENT
Start: 2022-08-15

## 2022-08-15 NOTE — PROGRESS NOTES
[address] [facility]  [address]  Phone: [phone] Fax [phone]  Zayra Hinojosa M.D.  8/15/2022        Gibson Salazar is a 81 y.o. male     HPI:  Patient is seen for Follow-up (2 month)  Patient was checking his blood pressure after previous visit here and noticed systolic blood pressure in the 140s to 150s after changing from lisinopril to Benicar. He is taking 40 mg daily and Benicar and 25 mg daily of chlorthalidone as well as 20 mg of Bystolic each day. Actually saw Dr. Roxana Barajas, cardiologist, in early July with blood pressure essentially in normal range when checked twice in their office. Since that time however, patient admits that he has not been watching his diet as closely and there has been several birthdays as well as 4 July and he reports eating a lot of watermelon with salt as well as almost daily eating to apples at lunch with salt on them. Had this today. Has not checked his blood pressure since having seen Dr. Roxana Barajas. Chart review does show that he has gained about 5 pounds since last visit here. His wife agrees that their diet has not been as good lately. Dr. Roxana Barajas did increase his atorvastatin from 20 mg to 40 mg daily. He reports tolerating this. The increase of the Lexapro to 10 mg has helped with underlying anxiety and he denies any significant depression. Actually the increase of the Lexapro has allowed him to sleep well at night without use of melatonin. ROS:  Denies any chest pain dyspnea diaphoresis or edema. No reported palpitations or tachycardia. No abdominal pain. No change in voiding or stooling. Denies any depression.      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    History of DVT (deep vein thrombosis) 06/05/2013    DDD (degenerative disc disease), lumbar 12/19/2019    Class 1 obesity due to excess calories with serious comorbidity and body mass index (BMI) of 32.0 to 32.9 in adult 12/19/2019    Situational anxiety 03/14/2022    Colon polyp 01/08/2013    Murmur, cardiac 08/15/2016    Atherosclerosis of native coronary artery of native heart without angina pectoris 08/03/2016    Chronic pain of both shoulders 11/23/2021    Pain in both upper arms 11/19/2020    Sinus bradycardia 12/19/2019    Stress 03/14/2022    Adjustment insomnia 03/14/2022    Nonrheumatic aortic valve sclerosis 03/14/2022    Essential hypertension 01/08/2013    Hyperlipidemia 08/03/2016     Resolved Ambulatory Problems     Diagnosis Date Noted    No Resolved Ambulatory Problems     Past Medical History:   Diagnosis Date    Cardiomyopathy in other disease 1/8/2013    Chest pain, unspecified 8/3/2016    Coronary atherosclerosis of native coronary vessel 8/3/2016    Dyslipidemia 8/3/2016    HTN (hypertension) 1/8/2013    HTN (hypertension), benign 8/3/2016    Hypercholesterolemia 1/8/2013    Pre-diabetes 1/8/2013        Past Surgical History:   Procedure Laterality Date    CARDIAC CATHETERIZATION  9/4/2008    S    CATARACT REMOVAL Right 08/16/2021    [facility]    CATARACT REMOVAL Left 08/30/2021    [facility]    COLONOSCOPY  05/24/2021    Dr. Kumar Brown; colon polyp, diverticulosis, internal hemorrhoid    COLONOSCOPY  7/15/15    Dr. Belle Raymond; repeat in 5 years    COLONOSCOPY  April 24, 2009    Dr. Ag Zhao; every 5 years    VASCULAR SURGERY  2019    venous LE surgery; Dr. Morales Screen History     Tobacco Use    Smoking status: Never    Smokeless tobacco: Current    Tobacco comments:     Quit smoking: dips   Substance Use Topics    Alcohol use:  Yes     Alcohol/week: 7.0 standard drinks    Drug use: No       Physical Exam:  Blood pressure (!) 164/90, pulse 68, temperature 98.4 °F (36.9 °C), temperature source Temporal, resp. rate 18, height 5' 9\" (1.753 m), weight 228 lb 8 oz (103.6 kg), SpO2 99 %. Pleasant male in no apparent distress. Affect is appropriate. HEENT grossly normal.  Oral mucosa is moist and intact. No cervical lymphadenopathy or thyromegaly or nodularity. Lungs clear. No JVD or hepatojugular reflux. Cardiovascular regular B7-Y4 with 2/6 systolic murmur in the aortic region especially without rubs or gallops. Radial pulses 2+. Abdomen is soft, moderately obese, and nontender with positive bowel sounds. No masses palpated. No pitting peripheral edema or cyanosis. Moves all extremities well. Assessment and Plan:   Diagnosis Orders   1. Uncontrolled hypertension  Azilsartan Medoxomil (EDARBI) 80 MG TABS      2. Situational anxiety        3. Adjustment insomnia        4. Pure hypercholesterolemia        5. Atherosclerosis of native coronary artery of native heart without angina pectoris        6. Nonrheumatic aortic valve sclerosis        7. Class 1 obesity due to excess calories with serious comorbidity and body mass index (BMI) of 33.0 to 33.9 in adult          Information on hypertension, DASH diet, a video on high blood pressure and the DASH diet, hyperlipidemia, and on anxiety provided. Definitely recommend not salting foods especially his fruits. He should start really watching his diet much more closely. I will change his Benicar 40 mg to Edarbi 80 mg daily with 7 weeks of samples provided. He should check his blood pressure occasionally in the morning and occasionally in the evening and write these down. Continue chlorthalidone 25 mg daily and Bystolic 20 mg daily. Continue Lexapro 10 mg daily. I will reassess patient here in 6 weeks with reassessment of blood pressure.   However also need to schedule patient for an appointment in early November fasting for follow-up and subsequent wellness exam.    Discharge Meds:  Current Outpatient Medications   Medication Sig Dispense Refill    Azilsartan Medoxomil (EDARBI) 80 MG TABS Take 1 tablet by mouth daily 30 tablet 0    Omega-3 Fatty Acids (FISH OIL) 1000 MG CAPS Take 3,000 mg by mouth 4 times daily      atorvastatin (LIPITOR) 40 MG tablet Take 1 tablet by mouth daily 90 tablet 3    diclofenac sodium (VOLTAREN) 1 % GEL Apply topically 2 times daily      escitalopram (LEXAPRO) 10 MG tablet Take 1 tablet by mouth daily 90 tablet 3    nebivolol (BYSTOLIC) 20 MG TABS tablet Take 1 tablet by mouth daily 1 p.o. each night for blood pressure 90 tablet 3    aspirin 81 MG chewable tablet Take 81 mg by mouth daily      chlorthalidone (HYGROTON) 25 MG tablet Take 25 mg by mouth daily      cyanocobalamin 1000 MCG tablet Take 1,000 mcg by mouth daily      folic acid (FOLVITE) 081 MCG tablet Take 800 mcg by mouth daily      ibuprofen (ADVIL;MOTRIN) 600 MG tablet Take 600 mg by mouth every 8 hours as needed       No current facility-administered medications for this visit. Return in about 6 weeks (around 9/27/2022). Any new medications were explained today including any potential drug interactions or significant side effects. The patient's questions in regards to this were answered today. Dictated using voice recognition software.   Proofread, but unrecognized errors may exist.

## 2022-09-10 DIAGNOSIS — I10 UNCONTROLLED HYPERTENSION: ICD-10-CM

## 2022-09-10 DIAGNOSIS — R09.89 LABILE HYPERTENSION: ICD-10-CM

## 2022-09-12 RX ORDER — OLMESARTAN MEDOXOMIL 40 MG/1
TABLET ORAL
Qty: 90 TABLET | OUTPATIENT
Start: 2022-09-12

## 2022-09-28 NOTE — TELEPHONE ENCOUNTER
Pt's wife called stating pt has run out of samples that Dr. Collette Couch gave him. Requesting 30 days to be sent to [facility] to hold pt till his next appt.

## 2022-09-29 RX ORDER — AZILSARTAN KAMEDOXOMIL 80 MG/1
1 TABLET ORAL DAILY
Qty: 30 TABLET | Refills: 0 | Status: SHIPPED | OUTPATIENT
Start: 2022-09-29 | End: 2022-10-10 | Stop reason: SDUPTHER

## 2022-10-03 ENCOUNTER — TELEPHONE (OUTPATIENT)
Dept: FAMILY MEDICINE CLINIC | Facility: CLINIC | Age: 72
End: 2022-10-03

## 2022-10-03 NOTE — TELEPHONE ENCOUNTER
Pt wife called stating that pt is out of the Edarbi samples. Rx is not affordable and asking if he can have some more samples of this medication? Last OV 8/15/22.  Next OV 10/10/22

## 2022-10-10 ENCOUNTER — OFFICE VISIT (OUTPATIENT)
Dept: FAMILY MEDICINE CLINIC | Facility: CLINIC | Age: 72
End: 2022-10-10
Payer: MEDICARE

## 2022-10-10 VITALS
SYSTOLIC BLOOD PRESSURE: 133 MMHG | TEMPERATURE: 97.6 F | HEART RATE: 54 BPM | BODY MASS INDEX: 31.4 KG/M2 | WEIGHT: 212 LBS | RESPIRATION RATE: 16 BRPM | OXYGEN SATURATION: 98 % | HEIGHT: 69 IN | DIASTOLIC BLOOD PRESSURE: 71 MMHG

## 2022-10-10 DIAGNOSIS — I10 ESSENTIAL HYPERTENSION: Primary | ICD-10-CM

## 2022-10-10 DIAGNOSIS — Z23 ENCOUNTER FOR IMMUNIZATION: ICD-10-CM

## 2022-10-10 PROCEDURE — G8484 FLU IMMUNIZE NO ADMIN: HCPCS | Performed by: FAMILY MEDICINE

## 2022-10-10 PROCEDURE — 4004F PT TOBACCO SCREEN RCVD TLK: CPT | Performed by: FAMILY MEDICINE

## 2022-10-10 PROCEDURE — G0008 ADMIN INFLUENZA VIRUS VAC: HCPCS | Performed by: FAMILY MEDICINE

## 2022-10-10 PROCEDURE — 99213 OFFICE O/P EST LOW 20 MIN: CPT | Performed by: FAMILY MEDICINE

## 2022-10-10 PROCEDURE — G8427 DOCREV CUR MEDS BY ELIG CLIN: HCPCS | Performed by: FAMILY MEDICINE

## 2022-10-10 PROCEDURE — G8417 CALC BMI ABV UP PARAM F/U: HCPCS | Performed by: FAMILY MEDICINE

## 2022-10-10 PROCEDURE — 1123F ACP DISCUSS/DSCN MKR DOCD: CPT | Performed by: FAMILY MEDICINE

## 2022-10-10 PROCEDURE — 3017F COLORECTAL CA SCREEN DOC REV: CPT | Performed by: FAMILY MEDICINE

## 2022-10-10 PROCEDURE — 90694 VACC AIIV4 NO PRSRV 0.5ML IM: CPT | Performed by: FAMILY MEDICINE

## 2022-10-10 RX ORDER — AZILSARTAN KAMEDOXOMIL 80 MG/1
1 TABLET ORAL DAILY
Qty: 90 TABLET | Refills: 3 | Status: SHIPPED | OUTPATIENT
Start: 2022-10-10

## 2022-10-10 ASSESSMENT — PATIENT HEALTH QUESTIONNAIRE - PHQ9
SUM OF ALL RESPONSES TO PHQ QUESTIONS 1-9: 0
SUM OF ALL RESPONSES TO PHQ QUESTIONS 1-9: 0
SUM OF ALL RESPONSES TO PHQ9 QUESTIONS 1 & 2: 0
1. LITTLE INTEREST OR PLEASURE IN DOING THINGS: 0
SUM OF ALL RESPONSES TO PHQ QUESTIONS 1-9: 0
SUM OF ALL RESPONSES TO PHQ QUESTIONS 1-9: 0
2. FEELING DOWN, DEPRESSED OR HOPELESS: 0

## 2022-10-10 NOTE — PROGRESS NOTES
[address] [facility]  [place], [address]  Phone: [phone] Fax [phone]  Brandon Araujo. Abdirizak Dove M.D.  10/10/2022        Severa Slider is a 81 y.o. male     HPI:  Patient is seen for Follow-up, Hypertension, and Medication Refill  Patient has tolerated the Edarbi 80 mg. He brings in his blood pressure record with systolics between 888 and 175 and diastolics between 59 and 71 with pulse rate 49-55. He does not report any lightheadedness, syncope or collapse. Has just a couple of days of Edarbi remaining. Last visit he has lost significant weight since last visit by watching his diet and reducing salt intake. ROS:  Denies any chest pain dyspnea diaphoresis or edema. No reported palpitations or tachycardia. No polydipsia or polyphagia or polyuria. Has lost 16 pounds since prior visit. No abdominal pain. No change in voiding or stooling. Denies any depression.      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    History of DVT (deep vein thrombosis) 06/05/2013    DDD (degenerative disc disease), lumbar 12/19/2019    Class 1 obesity due to excess calories with serious comorbidity and body mass index (BMI) of 32.0 to 32.9 in adult 12/19/2019    Situational anxiety 03/14/2022    Colon polyp 01/08/2013    Murmur, cardiac 08/15/2016    Atherosclerosis of native coronary artery of native heart without angina pectoris 08/03/2016    Chronic pain of both shoulders 11/23/2021    Pain in both upper arms 11/19/2020    Sinus bradycardia 12/19/2019    Stress 03/14/2022    Adjustment insomnia 03/14/2022    Nonrheumatic aortic valve sclerosis 03/14/2022 Essential hypertension 01/08/2013    Hyperlipidemia 08/03/2016     Resolved Ambulatory Problems     Diagnosis Date Noted    No Resolved Ambulatory Problems     Past Medical History:   Diagnosis Date    Cardiomyopathy in other disease 1/8/2013    Chest pain, unspecified 8/3/2016    Coronary atherosclerosis of native coronary vessel 8/3/2016    Dyslipidemia 8/3/2016    HTN (hypertension) 1/8/2013    HTN (hypertension), benign 8/3/2016    Hypercholesterolemia 1/8/2013    Pre-diabetes 1/8/2013        Past Surgical History:   Procedure Laterality Date    CARDIAC CATHETERIZATION  9/4/2008    [facility]    CATARACT REMOVAL Right 08/16/2021    Rosie Aviles    CATARACT REMOVAL Left 08/30/2021    Rosie Aviles    COLONOSCOPY  05/24/2021    Dr. Genoveva Edwards; colon polyp, diverticulosis, internal hemorrhoid    COLONOSCOPY  7/15/15    Dr. Guera Steel; repeat in 5 years    COLONOSCOPY  April 24, 2009    Dr. Odette Caamrgo; every 5 years    VASCULAR SURGERY  2019    venous LE surgery; Dr. Abigail Solis History     Tobacco Use    Smoking status: Never    Smokeless tobacco: Current    Tobacco comments:     Quit smoking: dips   Substance Use Topics    Alcohol use: Yes     Alcohol/week: 7.0 standard drinks    Drug use: No       Physical Exam:  Blood pressure 133/71, pulse 54, temperature 97.6 °F (36.4 °C), resp. rate 16, height 5' 9\" (1.753 m), weight 212 lb (96.2 kg), SpO2 98 %. Pleasant male in no apparent distress. Affect is appropriate. HEENT grossly normal.   Lungs clear. Cardiovascular regular I8-G9 with 2/6 systolic murmur without rubs or gallops. Radial pulses 2+. Abdomen is soft, mildly obese, and nontender with positive bowel sounds. No masses palpated. No pitting peripheral edema or cyanosis. Moves all extremities well. Assessment and Plan:   Diagnosis Orders   1. Essential hypertension  Azilsartan Medoxomil (EDARBI) 80 MG TABS      2.  Encounter for immunization  Influenza, FLUAD, (age 72 y+), IM, Preservative Free, 0.5 mL        Information on hypertension and on influenza vaccine provided. Gave 1 and 1/2 weeks samples of Edarbi 80 mg. Continue chlorthalidone and Bystolic. Keep follow-up November 15 fasting and for subsequent wellness exam.  Sent prescription to [facility] in [place] for the [place]. Without insurance a patient can get this for $40 a month plus shipping. High-dose flu shot given today. Discharge Meds:  Current Outpatient Medications   Medication Sig Dispense Refill    Azilsartan Medoxomil (EDARBI) 80 MG TABS Take 1 tablet by mouth daily 90 tablet 3    Omega-3 Fatty Acids (FISH OIL) 1000 MG CAPS Take 3,000 mg by mouth 4 times daily      atorvastatin (LIPITOR) 40 MG tablet Take 1 tablet by mouth daily 90 tablet 3    diclofenac sodium (VOLTAREN) 1 % GEL Apply topically 2 times daily      escitalopram (LEXAPRO) 10 MG tablet Take 1 tablet by mouth daily 90 tablet 3    nebivolol (BYSTOLIC) 20 MG TABS tablet Take 1 tablet by mouth daily 1 p.o. each night for blood pressure 90 tablet 3    aspirin 81 MG chewable tablet Take 81 mg by mouth daily      chlorthalidone (HYGROTON) 25 MG tablet Take 25 mg by mouth daily      cyanocobalamin 1000 MCG tablet Take 1,000 mcg by mouth daily      folic acid (FOLVITE) 580 MCG tablet Take 800 mcg by mouth daily      ibuprofen (ADVIL;MOTRIN) 600 MG tablet Take 600 mg by mouth every 8 hours as needed       No current facility-administered medications for this visit. Return in about 5 weeks (around 11/15/2022) for fasting and subwellness. Any new medications were explained today including any potential drug interactions or significant side effects. The patient's questions in regards to this were answered today. Dictated using voice recognition software.   Proofread, but unrecognized errors may exist.

## 2022-10-27 DIAGNOSIS — I10 ESSENTIAL (PRIMARY) HYPERTENSION: ICD-10-CM

## 2022-10-27 RX ORDER — LISINOPRIL 30 MG/1
TABLET ORAL
Qty: 90 TABLET | Refills: 0 | OUTPATIENT
Start: 2022-10-27

## 2022-11-03 ENCOUNTER — APPOINTMENT (RX ONLY)
Dept: URBAN - METROPOLITAN AREA CLINIC 329 | Facility: CLINIC | Age: 72
Setting detail: DERMATOLOGY
End: 2022-11-03

## 2022-11-03 DIAGNOSIS — L57.8 OTHER SKIN CHANGES DUE TO CHRONIC EXPOSURE TO NONIONIZING RADIATION: ICD-10-CM

## 2022-11-03 DIAGNOSIS — D22 MELANOCYTIC NEVI: ICD-10-CM | Status: STABLE

## 2022-11-03 PROBLEM — D22.39 MELANOCYTIC NEVI OF OTHER PARTS OF FACE: Status: ACTIVE | Noted: 2022-11-03

## 2022-11-03 PROBLEM — D48.5 NEOPLASM OF UNCERTAIN BEHAVIOR OF SKIN: Status: ACTIVE | Noted: 2022-11-03

## 2022-11-03 PROCEDURE — ? ADDITIONAL NOTES

## 2022-11-03 PROCEDURE — ? COUNSELING

## 2022-11-03 PROCEDURE — ? TREATMENT REGIMEN

## 2022-11-03 PROCEDURE — 11102 TANGNTL BX SKIN SINGLE LES: CPT

## 2022-11-03 PROCEDURE — 99203 OFFICE O/P NEW LOW 30 MIN: CPT | Mod: 25

## 2022-11-03 PROCEDURE — ? BIOPSY BY SHAVE METHOD

## 2022-11-03 PROCEDURE — ? FULL BODY SKIN EXAM - DECLINED

## 2022-11-03 ASSESSMENT — LOCATION DETAILED DESCRIPTION DERM
LOCATION DETAILED: LEFT CENTRAL MALAR CHEEK
LOCATION DETAILED: LEFT INFERIOR CENTRAL MALAR CHEEK

## 2022-11-03 ASSESSMENT — LOCATION SIMPLE DESCRIPTION DERM: LOCATION SIMPLE: LEFT CHEEK

## 2022-11-03 ASSESSMENT — LOCATION ZONE DERM: LOCATION ZONE: FACE

## 2022-11-03 NOTE — PROCEDURE: BIOPSY BY SHAVE METHOD
Detail Level: Detailed
Depth Of Biopsy: dermis
Was A Bandage Applied: Yes
Size Of Lesion In Cm: 0.6
X Size Of Lesion In Cm: 0
Biopsy Type: H and E
Biopsy Method: Dermablade
Anesthesia Type: 1% lidocaine with epinephrine and a 1:10 solution of 8.4% sodium bicarbonate
Anesthesia Volume In Cc (Will Not Render If 0): 0.5
Hemostasis: Aluminum Chloride
Wound Care: Petrolatum
Dressing: bandage
Destruction After The Procedure: No
Type Of Destruction Used: Curettage
Curettage Text: The wound bed was treated with curettage after the biopsy was performed.
Cryotherapy Text: The wound bed was treated with cryotherapy after the biopsy was performed.
Electrodesiccation Text: The wound bed was treated with electrodesiccation after the biopsy was performed.
Electrodesiccation And Curettage Text: The wound bed was treated with electrodesiccation and curettage after the biopsy was performed.
Silver Nitrate Text: The wound bed was treated with silver nitrate after the biopsy was performed.
Lab: 6
Lab Facility: 3
Consent was obtained and risks were reviewed including but not limited to scarring, infection, bleeding, scabbing, incomplete removal, nerve damage and allergy to anesthesia.
Post-Care Instructions: I reviewed with the patient in detail post-care instructions. Patient is to keep the biopsy site dry overnight, and then apply petrolatum twice daily until healed.
Notification Instructions: Patient will be notified of biopsy results. However, patient instructed to call the office if not contacted within 2 weeks.
Billing Type: Third-Party Bill
Information: Selecting Yes will display possible errors in your note based on the variables you have selected. This validation is only offered as a suggestion for you. PLEASE NOTE THAT THE VALIDATION TEXT WILL BE REMOVED WHEN YOU FINALIZE YOUR NOTE. IF YOU WANT TO FAX A PRELIMINARY NOTE YOU WILL NEED TO TOGGLE THIS TO 'NO' IF YOU DO NOT WANT IT IN YOUR FAXED NOTE.

## 2022-11-03 NOTE — PROCEDURE: MIPS QUALITY
Quality 226: Preventive Care And Screening: Tobacco Use: Screening And Cessation Intervention: Patient screened for tobacco use, is a smoker AND did not received Cessation Counseling for Unknown Reasons within the Previous 12 Months
Quality 111:Pneumonia Vaccination Status For Older Adults: Pneumococcal vaccine (PPSV23) administered on or after patient’s 60th birthday and before the end of the measurement period
Detail Level: Detailed
Quality 431: Preventive Care And Screening: Unhealthy Alcohol Use - Screening: Patient not identified as an unhealthy alcohol user when screened for unhealthy alcohol use using a systematic screening method
Quality 110: Preventive Care And Screening: Influenza Immunization: Influenza Immunization Administered during Influenza season

## 2022-11-03 NOTE — HPI: SKIN LESION
How Severe Is Your Skin Lesion?: mild
Is This A New Presentation, Or A Follow-Up?: Skin Lesion
Additional History: Patient states he has a lesion on his left cheek that has been present for about five years. He states it could have gotten darker but he isn’t sure because he can’t really see it .

## 2022-11-16 ENCOUNTER — TELEPHONE (OUTPATIENT)
Dept: CARDIOLOGY CLINIC | Age: 72
End: 2022-11-16

## 2022-11-16 NOTE — TELEPHONE ENCOUNTER
----- Message from Yasmin Joseph LPN sent at 12/99/3921  8:00 AM EST -----    ----- Message -----  From: Daryl Delarosa MD  Sent: 11/15/2022   6:59 PM EST  To: Yasmin Joseph LPN    Call pt lipid status looks stable

## 2022-11-16 NOTE — TELEPHONE ENCOUNTER
----- Message from Lexie Bain LPN sent at 17/87/4149  8:00 AM EST -----    ----- Message -----  From: Georgina Kim MD  Sent: 11/15/2022   6:59 PM EST  To: Lexie Bain LPN    Call pt lipid status looks stable

## 2022-11-16 NOTE — TELEPHONE ENCOUNTER
----- Message from Lexie Bain LPN sent at 65/01/7601  8:00 AM EST -----    ----- Message -----  From: Georgina Kim MD  Sent: 11/15/2022   6:59 PM EST  To: Lexie Bain LPN    Call pt lipid status looks stable

## 2022-11-22 ENCOUNTER — OFFICE VISIT (OUTPATIENT)
Dept: FAMILY MEDICINE CLINIC | Facility: CLINIC | Age: 72
End: 2022-11-22
Payer: MEDICARE

## 2022-11-22 ENCOUNTER — NURSE ONLY (OUTPATIENT)
Dept: FAMILY MEDICINE CLINIC | Facility: CLINIC | Age: 72
End: 2022-11-22

## 2022-11-22 VITALS
DIASTOLIC BLOOD PRESSURE: 86 MMHG | BODY MASS INDEX: 31.03 KG/M2 | SYSTOLIC BLOOD PRESSURE: 142 MMHG | TEMPERATURE: 97.5 F | HEIGHT: 69 IN | RESPIRATION RATE: 18 BRPM | HEART RATE: 55 BPM | WEIGHT: 209.5 LBS | OXYGEN SATURATION: 98 %

## 2022-11-22 DIAGNOSIS — F41.8 SITUATIONAL ANXIETY: ICD-10-CM

## 2022-11-22 DIAGNOSIS — I25.10 ATHEROSCLEROSIS OF NATIVE CORONARY ARTERY OF NATIVE HEART WITHOUT ANGINA PECTORIS: ICD-10-CM

## 2022-11-22 DIAGNOSIS — I35.8 NONRHEUMATIC AORTIC VALVE SCLEROSIS: ICD-10-CM

## 2022-11-22 DIAGNOSIS — I10 ESSENTIAL HYPERTENSION: ICD-10-CM

## 2022-11-22 DIAGNOSIS — Z13.1 DIABETES MELLITUS SCREENING: ICD-10-CM

## 2022-11-22 DIAGNOSIS — Z23 NEED FOR PROPHYLACTIC VACCINATION AND INOCULATION AGAINST VARICELLA: ICD-10-CM

## 2022-11-22 DIAGNOSIS — E78.00 PURE HYPERCHOLESTEROLEMIA: ICD-10-CM

## 2022-11-22 DIAGNOSIS — R73.09 ELEVATED GLUCOSE: ICD-10-CM

## 2022-11-22 DIAGNOSIS — Z00.00 MEDICARE ANNUAL WELLNESS VISIT, SUBSEQUENT: Primary | ICD-10-CM

## 2022-11-22 DIAGNOSIS — R35.1 NOCTURIA: ICD-10-CM

## 2022-11-22 DIAGNOSIS — M51.36 DDD (DEGENERATIVE DISC DISEASE), LUMBAR: ICD-10-CM

## 2022-11-22 DIAGNOSIS — E66.09 CLASS 1 OBESITY DUE TO EXCESS CALORIES WITH SERIOUS COMORBIDITY AND BODY MASS INDEX (BMI) OF 30.0 TO 30.9 IN ADULT: ICD-10-CM

## 2022-11-22 DIAGNOSIS — Z23 NEED FOR PROPHYLACTIC VACCINATION AGAINST DIPHTHERIA-TETANUS-PERTUSSIS (DTP): ICD-10-CM

## 2022-11-22 LAB
BASOPHILS # BLD: 0.1 K/UL (ref 0–0.2)
BASOPHILS NFR BLD: 1 % (ref 0–2)
DIFFERENTIAL METHOD BLD: ABNORMAL
EOSINOPHIL # BLD: 0.2 K/UL (ref 0–0.8)
EOSINOPHIL NFR BLD: 4 % (ref 0.5–7.8)
ERYTHROCYTE [DISTWIDTH] IN BLOOD BY AUTOMATED COUNT: 12.3 % (ref 11.9–14.6)
EST. AVERAGE GLUCOSE BLD GHB EST-MCNC: 100 MG/DL
HBA1C MFR BLD: 5.1 % (ref 4.8–5.6)
HCT VFR BLD AUTO: 33.8 % (ref 41.1–50.3)
HGB BLD-MCNC: 11.8 G/DL (ref 13.6–17.2)
IMM GRANULOCYTES # BLD AUTO: 0 K/UL (ref 0–0.5)
IMM GRANULOCYTES NFR BLD AUTO: 0 % (ref 0–5)
LYMPHOCYTES # BLD: 1.5 K/UL (ref 0.5–4.6)
LYMPHOCYTES NFR BLD: 29 % (ref 13–44)
MCH RBC QN AUTO: 33.5 PG (ref 26.1–32.9)
MCHC RBC AUTO-ENTMCNC: 34.9 G/DL (ref 31.4–35)
MCV RBC AUTO: 96 FL (ref 82–102)
MONOCYTES # BLD: 0.6 K/UL (ref 0.1–1.3)
MONOCYTES NFR BLD: 12 % (ref 4–12)
NEUTS SEG # BLD: 2.8 K/UL (ref 1.7–8.2)
NEUTS SEG NFR BLD: 54 % (ref 43–78)
NRBC # BLD: 0 K/UL (ref 0–0.2)
PLATELET # BLD AUTO: 217 K/UL (ref 150–450)
PMV BLD AUTO: 10.1 FL (ref 9.4–12.3)
RBC # BLD AUTO: 3.52 M/UL (ref 4.23–5.6)
WBC # BLD AUTO: 5.2 K/UL (ref 4.3–11.1)

## 2022-11-22 PROCEDURE — G8417 CALC BMI ABV UP PARAM F/U: HCPCS | Performed by: FAMILY MEDICINE

## 2022-11-22 PROCEDURE — 99214 OFFICE O/P EST MOD 30 MIN: CPT | Performed by: FAMILY MEDICINE

## 2022-11-22 PROCEDURE — 3078F DIAST BP <80 MM HG: CPT | Performed by: FAMILY MEDICINE

## 2022-11-22 PROCEDURE — G0439 PPPS, SUBSEQ VISIT: HCPCS | Performed by: FAMILY MEDICINE

## 2022-11-22 PROCEDURE — G8484 FLU IMMUNIZE NO ADMIN: HCPCS | Performed by: FAMILY MEDICINE

## 2022-11-22 PROCEDURE — 4004F PT TOBACCO SCREEN RCVD TLK: CPT | Performed by: FAMILY MEDICINE

## 2022-11-22 PROCEDURE — 3017F COLORECTAL CA SCREEN DOC REV: CPT | Performed by: FAMILY MEDICINE

## 2022-11-22 PROCEDURE — 3074F SYST BP LT 130 MM HG: CPT | Performed by: FAMILY MEDICINE

## 2022-11-22 PROCEDURE — G8427 DOCREV CUR MEDS BY ELIG CLIN: HCPCS | Performed by: FAMILY MEDICINE

## 2022-11-22 PROCEDURE — 1123F ACP DISCUSS/DSCN MKR DOCD: CPT | Performed by: FAMILY MEDICINE

## 2022-11-22 RX ORDER — CHLORTHALIDONE 25 MG/1
25 TABLET ORAL DAILY
Qty: 90 TABLET | Refills: 3 | Status: SHIPPED | OUTPATIENT
Start: 2022-11-22

## 2022-11-22 ASSESSMENT — PATIENT HEALTH QUESTIONNAIRE - PHQ9
SUM OF ALL RESPONSES TO PHQ QUESTIONS 1-9: 0
1. LITTLE INTEREST OR PLEASURE IN DOING THINGS: 0
SUM OF ALL RESPONSES TO PHQ QUESTIONS 1-9: 0
2. FEELING DOWN, DEPRESSED OR HOPELESS: 0
SUM OF ALL RESPONSES TO PHQ9 QUESTIONS 1 & 2: 0

## 2022-11-22 ASSESSMENT — LIFESTYLE VARIABLES
HOW OFTEN DO YOU HAVE A DRINK CONTAINING ALCOHOL: 2-4 TIMES A MONTH
HOW MANY STANDARD DRINKS CONTAINING ALCOHOL DO YOU HAVE ON A TYPICAL DAY: 3 OR 4

## 2022-11-22 NOTE — PROGRESS NOTES
Medicare Annual Wellness Visit    Loera Signs is here for Medicare AWV (1 yr)    Assessment & Plan   Medicare annual wellness visit, subsequent  Need for prophylactic vaccination against diphtheria-tetanus-pertussis (DTP)  -     Tdap (ADACEL) 5-2-15.5 LF-MCG/0.5 injection; Inject 0.5 mLs into the muscle once for 1 dose, Disp-0.5 mL, R-0Print  Need for prophylactic vaccination and inoculation against varicella  -     zoster recombinant adjuvanted vaccine [facility]) 50 MCG/0.5ML SUSR injection; Inject 0.5 mLs into the muscle once for 1 dose, Disp-0.5 mL, R-0Print  Diabetes mellitus screening    Discussed with patient in detail Medicare subsequent annual wellness exam.  Wellness/anticipatory guidance information provided. Information on advanced directives discussed and printed. Shingles and Tdap vaccine potential benefit discussed with prescription(s) printed; should discuss with pharmacy potential cost(s). Patient may also get these at the health department. Patient has had his flu shot. He has had a COVID booster and will get us the date of this. LIZETH signed for [facility] as he was seen in the recent past.  Smokeless tobacco cessation recommended with patient needing a dental visit regularly. Recommendations for Preventive Services Due: see orders and patient instructions/AVS.  Recommended screening schedule for the next 5-10 years is provided to the patient in written form: see Patient Instructions/AVS.     No follow-ups on file. Patient's complete Health Risk Assessment and screening values have been reviewed and are found in Flowsheets. The following problems were reviewed today and where indicated follow up appointments were made and/or referrals ordered.     Positive Risk Factor Screenings with Interventions:       Tobacco Use:  Tobacco Use: High Risk    Smoking Tobacco Use: Never    Smokeless Tobacco Use: Current    Passive Exposure: Not on file     E-cigarette/Vaping Questions Responses    E-cigarette/Vaping Use Never User    Start Date     Passive Exposure     Quit Date     Counseling Given     Comments           Substance Use - Tobacco Interventions:  Info on smokeless tobacco cessation given         General Health and ACP:  General  In general, how would you say your health is?: Good  In the past 7 days, have you experienced any of the following: New or Increased Pain, New or Increased Fatigue, Loneliness, Social Isolation, Stress or Anger?: No  Do you get the social and emotional support that you need?: Yes  Do you have a Living Will?: (!) No    Advance Directives       Power of  Living Will ACP-Advance Directive ACP-Power of     Not on File Not on File Not on File Not on File          General Health Risk Interventions:  No Living Will: [address] addressed with patient today    Health Habits/Nutrition:  Physical Activity: Inactive    Days of Exercise per Week: 0 days    Minutes of Exercise per Session: 0 min     Have you lost any weight without trying in the past 3 months?: No  Body mass index: (!) 30.93  Have you seen the dentist within the past year?: (!) No  Health Habits/Nutrition Interventions:  Dental exam overdue:  patient encouraged to make appointment with his/her dentist          Immunization History   Administered Date(s) Administered    COVID-19, MODERNA BLUE border, Primary or Immunocompromised, (age 12y+), IM, 100 mcg/0.5mL 02/25/2021, 03/25/2021    Influenza Trivalent 01/06/2014    Influenza Virus Vaccine 11/01/2012    Influenza, FLUAD, (age 72 y+), Adjuvanted, 0.5mL 11/23/2021, 10/10/2022    Influenza, High Dose (Fluzone 65 yrs and older) 10/01/2016, 09/07/2017, 01/24/2019, 10/10/2020    Influenza, Triv, inactivated, subunit, adjuvanted, IM (Fluad 65 yrs and older) 01/24/2019    Influenza, Trivalent PF 09/23/2015    Pneumococcal Conjugate 13-valent ([id number]) 09/07/2017    Pneumococcal Polysaccharide ([id number]) 11/29/2018    Td vaccine (adult) 01/01/2010, 01/01/2010          Objective   Vitals:    11/22/22 1409 11/22/22 1429   BP: (!) 157/71 (!) 142/86   Pulse: 55    Resp: 18    Temp: 97.5 °F (36.4 °C)    TempSrc: Temporal    SpO2: 98%    Weight: 209 lb 8 oz (95 kg)    Height: 5' 9\" (1.753 m)       Body mass index is 30.94 kg/m². Allergies   Allergen Reactions    Escitalopram Other (See Comments)     Upset stomach and felt poorly    Valsartan Other (See Comments)     Felt like he was having crazy thoughts and lightheadedness     Prior to Visit Medications    Medication Sig Taking?  Authorizing Provider   chlorthalidone (HYGROTON) 25 MG tablet Take 1 tablet by mouth daily Yes Marta Perez MD   Tdap (ADACEL) 5-2-15.5 LF-MCG/0.5 injection Inject 0.5 mLs into the muscle once for 1 dose Yes Marta Perez MD   zoster recombinant adjuvanted vaccine [facility]) 50 MCG/0.5ML SUSR injection Inject 0.5 mLs into the muscle once for 1 dose Yes Marta Perez MD   Azilsartan Medoxomil (EDARBI) 80 MG TABS Take 1 tablet by mouth daily Yes Marta Perez MD   Omega-3 Fatty Acids (FISH OIL) 1000 MG CAPS Take 3,000 mg by mouth 4 times daily Yes Historical Provider, MD   atorvastatin (LIPITOR) 40 MG tablet Take 1 tablet by mouth daily Yes Ellie Medellin MD   diclofenac sodium (VOLTAREN) 1 % GEL Apply topically 2 times daily Yes Historical Provider, MD   escitalopram (LEXAPRO) 10 MG tablet Take 1 tablet by mouth daily Yes Marta Perez MD   nebivolol (BYSTOLIC) 20 MG TABS tablet Take 1 tablet by mouth daily 1 p.o. each night for blood pressure Yes Marta Perez MD   aspirin 81 MG chewable tablet Take 81 mg by mouth daily Yes Ar Automatic Reconciliation   cyanocobalamin 1000 MCG tablet Take 1,000 mcg by mouth daily Yes Ar Automatic Reconciliation   folic acid (FOLVITE) 516 MCG tablet Take 800 mcg by mouth daily Yes Ar Automatic Reconciliation   ibuprofen (ADVIL;MOTRIN) 600 MG tablet Take 600 mg by mouth every 8 hours as needed Yes Ar Automatic Reconciliation       CareTeam (Including outside providers/suppliers regularly involved in providing care):   Patient Care Team:  Rosemary Mccracken MD as PCP - General  Rosemary Mccracken MD as PCP - [facility]     Reviewed and updated this visit:  Tobacco  Allergies  Meds  Problems  Med Hx  Surg Hx  Soc Hx  Fam Hx        [facility] [address]  Phone: [phone] Fax [phone]  Ghanshyamdawit Kumar. Sherine Montez M.D.  11/22/2022        Radha Michele is a 81 y.o. male     HPI:  Patient is seen for follow-up having had fasting lab work drawn last week. Blood work drawn was a fasting lipid panel with this noted to be normal.  He did not have any other labs drawn at that time. Patient brings in his blood pressure record with 18 readings done at different times of the day with systolic pressures between 111 and 147 with only 3 of these greater than 140. Diastolic pressures between 59 and 69. Has felt well while taking recent medications. Needs a refill of his chlorthalidone. No current routine exercise. He continues his Lexapro with fair benefit for anxiety. Denies any significant depression. No thoughts of harming himself or others. Patient saw Dr. Alexander Posadas, cardiologist, in July. Has history of nonrheumatic aortic valve sclerosis and history of atherosclerotic coronary artery disease without any angina. ROS:  Denies any chest pain dyspnea diaphoresis or edema. No reported palpitations or tachycardia. No polydipsia or polyphagia or polyuria. No large weight fluctuations. No recurrent dyspepsia or reflux. No abdominal pain. No hematochezia melena diarrhea or constipation. No dysuria or hematuria. No recent falls.     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    History of DVT (deep vein thrombosis) 06/05/2013    DDD (degenerative disc disease), lumbar 12/19/2019    Class 1 obesity due to excess calories with serious comorbidity and body mass index (BMI) of 32.0 to 32.9 in adult 12/19/2019    Situational anxiety 03/14/2022    Colon polyp 01/08/2013    Murmur, cardiac 08/15/2016    Atherosclerosis of native coronary artery of native heart without angina pectoris 08/03/2016    Chronic pain of both shoulders 11/23/2021    Pain in both upper arms 11/19/2020    Sinus bradycardia 12/19/2019    Stress 03/14/2022    Adjustment insomnia 03/14/2022    Nonrheumatic aortic valve sclerosis 03/14/2022    Essential hypertension 01/08/2013    Hyperlipidemia 08/03/2016     Resolved Ambulatory Problems     Diagnosis Date Noted    No Resolved Ambulatory Problems     Past Medical History:   Diagnosis Date    Cardiomyopathy in other disease 1/8/2013    Chest pain, unspecified 8/3/2016    Coronary atherosclerosis of native coronary vessel 8/3/2016    Dyslipidemia 8/3/2016    HTN (hypertension) 1/8/2013    HTN (hypertension), benign 8/3/2016    Hypercholesterolemia 1/8/2013    Pre-diabetes 1/8/2013        Past Surgical History:   Procedure Laterality Date    CARDIAC CATHETERIZATION  9/4/2008    [facility]    CATARACT REMOVAL Right 08/16/2021    Audria Necessary    CATARACT REMOVAL Left 08/30/2021    Audria Necessary    COLONOSCOPY  05/24/2021    Dr. Abbi Melton; colon polyp, diverticulosis, internal hemorrhoid    COLONOSCOPY  7/15/15    Dr. Bre Craig; repeat in 5 years    COLONOSCOPY  April 24, 2009    Dr. Juan Lea; every 5 years    VASCULAR SURGERY  2019    venous LE surgery; Dr. Gianluca Isidro History     Tobacco Use    Smoking status: Never    Smokeless tobacco: Current     Types: Snuff    Tobacco comments:     Quit smoking: dips   Vaping Use    Vaping Use: Never used   Substance Use Topics    Alcohol use: Not Currently     Alcohol/week: 4.0 standard drinks     Types: 4 Cans of beer per week    Drug use: No       Physical Exam:  Blood pressure (!) 142/86, pulse 55, temperature 97.5 °F (36.4 °C), temperature source Temporal, resp. rate 18, height 5' 9\" (1.753 m), weight 209 lb 8 oz (95 kg), SpO2 98 %. Pleasant male in no apparent distress. Affect is appropriate. Lower eyelids are not pale. HEENT grossly normal.  Oral mucosa is moist and intact. No cervical lymphadenopathy or thyromegaly or nodularity. Lungs clear. No JVD or hepatojugular reflux. Cardiovascular regular S1-S2 with 2 or 6 systolic murmur heard best at the aortic region without rubs or gallops. Radial pulses 2+. Abdomen is soft, mildly obese, and nontender with positive bowel sounds. No masses palpated. No pitting peripheral edema or cyanosis. Moves all extremities well. Assessment and Plan:  5. Elevated glucose  Hemoglobin A1C      6. Essential hypertension  chlorthalidone (HYGROTON) 25 MG tablet    CBC with Auto Differential    Comprehensive Metabolic Panel    TSH      7. Pure hypercholesterolemia  Comprehensive Metabolic Panel      8. Situational anxiety        9. Nocturia  PSA, Diagnostic      10. DDD (degenerative disc disease), lumbar        11. Nonrheumatic aortic valve sclerosis        13. Class 1 obesity due to excess calories with serious comorbidity and body mass index (BMI) of 30.0 to 30.9 in adult     12. Atherosclerotic coronary artery disease without angina     Information on hypertension, hyperlipidemia, starting weight loss plan, anxiety, low carbohydrate diet, video sleeping better, and information on quitting smokeless tobacco provided. Reviewed with patient his lipid panel which was drawn last week with this being in normal range. Await nonfasting labs as above with A1c level having had prior elevated glucose.  Encouraged 150 minutes of low impact aerobic activity weekly. Also encouraged resistance training every other day with 24-48 hours of muscle glucose uptake subsequently. Reviewed with patient his blood pressures as noted above with this acceptable at present. Reviewed with patient had seen Dr. Yulia Valero, cardiologist, in the recent past.  Follow-up with his dermatologist as planned. Refilled his chlorthalidone. Plan reassessment here in 6 months or more quickly as needed. Spent 30 minutes with patient today in examination, evaluation, and documentation in addition to wellness exam.      Discharge Meds:  Current Outpatient Medications   Medication Sig Dispense Refill    chlorthalidone (HYGROTON) 25 MG tablet Take 1 tablet by mouth daily 90 tablet 3    Tdap (ADACEL) 5-2-15.5 LF-MCG/0.5 injection Inject 0.5 mLs into the muscle once for 1 dose 0.5 mL 0    zoster recombinant adjuvanted vaccine (SHINGRIX) 50 MCG/0.5ML SUSR injection Inject 0.5 mLs into the muscle once for 1 dose 0.5 mL 0    Azilsartan Medoxomil (EDARBI) 80 MG TABS Take 1 tablet by mouth daily 90 tablet 3    Omega-3 Fatty Acids (FISH OIL) 1000 MG CAPS Take 3,000 mg by mouth 4 times daily      atorvastatin (LIPITOR) 40 MG tablet Take 1 tablet by mouth daily 90 tablet 3    diclofenac sodium (VOLTAREN) 1 % GEL Apply topically 2 times daily      escitalopram (LEXAPRO) 10 MG tablet Take 1 tablet by mouth daily 90 tablet 3    nebivolol (BYSTOLIC) 20 MG TABS tablet Take 1 tablet by mouth daily 1 p.o. each night for blood pressure 90 tablet 3    aspirin 81 MG chewable tablet Take 81 mg by mouth daily      cyanocobalamin 1000 MCG tablet Take 1,000 mcg by mouth daily      folic acid (FOLVITE) 400 MCG tablet Take 800 mcg by mouth daily      ibuprofen (ADVIL;MOTRIN) 600 MG tablet Take 600 mg by mouth every 8 hours as needed       No current facility-administered medications for this visit. No follow-ups on file.         Any new medications were explained today including any potential drug interactions or significant side effects. The patient's questions in regards to this were answered today. Dictated using voice recognition software.   Proofread, but unrecognized errors may exist.

## 2022-11-22 NOTE — PATIENT INSTRUCTIONS
Learning About Low-Carbohydrate Diets  What is a low-carbohydrate diet? A low-carbohydrate (or \"low-carb\") diet limits foods and drinks that have carbohydrates. This includes grains, fruits, milk and yogurt, and starchy vegetables like potatoes, beans, and corn. It also avoids foods and drinks that have added sugar. Instead, low-carb diets include foods that are high in protein and fat. Why might you follow a low-carb diet? Low-carb diets may be used for a variety of reasons, such as for weight loss. People who have diabetes may use a low-carb diet to help manage their blood sugar levels. What should you do before you start the diet? Talk to your doctor before you try any diet. This is even more important if you have health problems like kidney disease, heart disease, or diabetes. Your doctor may suggest that you meet with a registered dietitian. A dietitian can help you make an eating plan that works for you. What foods do you eat on a low-carb diet? On a low-carb diet, you choose foods that are high in protein and fat. Examples of these are:  Meat, poultry, and fish. Eggs. Nuts, such as walnuts, pecans, almonds, and peanuts. Peanut butter and other nut butters. Tofu. Avocado. Radha Ronald. Non-starchy vegetables like broccoli, cauliflower, green beans, mushrooms, peppers, lettuce, and spinach. Unsweetened non-dairy milks like almond milk and coconut milk. Cheese, cottage cheese, and cream cheese. Where can you learn more? Go to [email or web address] org and sign in to your [employer] account. Enter C335 in the [facility] box to learn more about \"Learning About Low-Carbohydrate Diets. \"     If you do not have an account, please click on the \"Sign Up Now\" link. Current as of: May 9, 2022               Content Version: 13.4  © 6940-1771 Healthwise, Incorporated. Care instructions adapted under license by [facility] ([facility]).  If you have questions about a medical condition or this instruction, always ask your healthcare professional. [place] [address] any warranty or liability for your use of this information. Personalized Preventive Plan for Jack Jarquin - 11/22/2022  Medicare offers a range of preventive health benefits. Some of the tests and screenings are paid in full while other may be subject to a deductible, co-insurance, and/or copay. Some of these benefits include a comprehensive review of your medical history including lifestyle, illnesses that may run in your family, and various assessments and screenings as appropriate. After reviewing your medical record and screening and assessments performed today your provider may have ordered immunizations, labs, imaging, and/or referrals for you. A list of these orders (if applicable) as well as your Preventive Care list are included within your After Visit Summary for your review. Other Preventive Recommendations:    A preventive eye exam performed by an eye specialist is recommended every 1-2 years to screen for glaucoma; cataracts, macular degeneration, and other eye disorders. A preventive dental visit is recommended every 6 months. Try to get at least 150 minutes of exercise per week or 10,000 steps per day on a pedometer . Order or download the FREE \"Exercise & Physical Activity: Your Everyday Guide\" from [employer]. Call 4-890.130.7712 or search [employer] online. You need 1912-1391 mg of calcium and 0437-6810 IU of vitamin D per day. It is possible to meet your calcium requirement with diet alone, but a vitamin D supplement is usually necessary to meet this goal.  When exposed to the sun, use a sunscreen that protects against both UVA and UVB radiation with an SPF of 30 or greater. Reapply every 2 to 3 hours or after sweating, drying off with a towel, or swimming. Always wear a seat belt when traveling in a car.  Always wear a helmet when riding a bicycle or motorcycle.

## 2022-11-23 LAB
ALBUMIN SERPL-MCNC: 4.1 G/DL (ref 3.2–4.6)
ALBUMIN/GLOB SERPL: 1.8 {RATIO} (ref 0.4–1.6)
ALP SERPL-CCNC: 63 U/L (ref 50–136)
ALT SERPL-CCNC: 30 U/L (ref 12–65)
ANION GAP SERPL CALC-SCNC: 4 MMOL/L (ref 2–11)
AST SERPL-CCNC: 20 U/L (ref 15–37)
BILIRUB SERPL-MCNC: 0.6 MG/DL (ref 0.2–1.1)
BUN SERPL-MCNC: 22 MG/DL (ref 8–23)
CALCIUM SERPL-MCNC: 9.2 MG/DL (ref 8.3–10.4)
CHLORIDE SERPL-SCNC: 100 MMOL/L (ref 101–110)
CO2 SERPL-SCNC: 30 MMOL/L (ref 21–32)
CREAT SERPL-MCNC: 1.2 MG/DL (ref 0.8–1.5)
GLOBULIN SER CALC-MCNC: 2.3 G/DL (ref 2.8–4.5)
GLUCOSE SERPL-MCNC: 110 MG/DL (ref 65–100)
POTASSIUM SERPL-SCNC: 5 MMOL/L (ref 3.5–5.1)
PROT SERPL-MCNC: 6.4 G/DL (ref 6.3–8.2)
PSA SERPL-MCNC: 0.3 NG/ML
SODIUM SERPL-SCNC: 134 MMOL/L (ref 133–143)
TSH, 3RD GENERATION: 1.69 UIU/ML (ref 0.36–3.74)

## 2022-12-19 ENCOUNTER — TELEPHONE (OUTPATIENT)
Dept: ORTHOPEDIC SURGERY | Age: 72
End: 2022-12-19

## 2022-12-19 NOTE — TELEPHONE ENCOUNTER
He fell a few weeks ago and is still having shoulder pain. Can you call his wife, Lacey Chinchilla, and get him in to see [address]. They would like first of the year in the afternoon at [place] if possible.  This is my uncle  ( ;

## 2022-12-20 NOTE — TELEPHONE ENCOUNTER
I called patient and LVM that Dr. Augusto Patino could see him on January 4, 2023 at 1:10 PM at our [facility] office. I told him to call back if that appointment works for him or we can find another appointment that does.

## 2023-01-05 ENCOUNTER — TELEPHONE (OUTPATIENT)
Dept: ORTHOPEDIC SURGERY | Age: 73
End: 2023-01-05

## 2023-01-05 NOTE — TELEPHONE ENCOUNTER
Left patient a vm on  and cell to call and sched an appt with Dr Matteo Rhodes per request by [facility]

## 2023-01-05 NOTE — TELEPHONE ENCOUNTER
He supposedly had an appt to see BSK with a shoulder issue. I am not sure what happened to the appt. Can you call him and get him RS to see him please.

## 2023-01-13 ENCOUNTER — TELEPHONE (OUTPATIENT)
Dept: FAMILY MEDICINE CLINIC | Facility: CLINIC | Age: 73
End: 2023-01-13

## 2023-01-14 ENCOUNTER — HOSPITAL ENCOUNTER (EMERGENCY)
Age: 73
Discharge: HOME | End: 2023-01-14
Attending: EMERGENCY MEDICINE
Payer: MEDICARE

## 2023-01-14 ENCOUNTER — APPOINTMENT (OUTPATIENT)
Dept: CT IMAGING | Age: 73
End: 2023-01-14
Payer: MEDICARE

## 2023-01-14 VITALS
HEART RATE: 57 BPM | HEIGHT: 69 IN | OXYGEN SATURATION: 99 % | TEMPERATURE: 98.1 F | RESPIRATION RATE: 18 BRPM | DIASTOLIC BLOOD PRESSURE: 69 MMHG | BODY MASS INDEX: 29.62 KG/M2 | SYSTOLIC BLOOD PRESSURE: 123 MMHG | WEIGHT: 200 LBS

## 2023-01-14 DIAGNOSIS — H81.11 BENIGN PAROXYSMAL POSITIONAL VERTIGO OF RIGHT EAR: Primary | ICD-10-CM

## 2023-01-14 DIAGNOSIS — E86.0 DEHYDRATION: ICD-10-CM

## 2023-01-14 LAB
EKG ATRIAL RATE: 56 BPM
EKG DIAGNOSIS: NORMAL
EKG P AXIS: 108 DEGREES
EKG P-R INTERVAL: 292 MS
EKG Q-T INTERVAL: 414 MS
EKG QRS DURATION: 98 MS
EKG QTC CALCULATION (BAZETT): 399 MS
EKG R AXIS: 12 DEGREES
EKG T AXIS: 41 DEGREES
EKG VENTRICULAR RATE: 56 BPM

## 2023-01-14 PROCEDURE — 93005 ELECTROCARDIOGRAM TRACING: CPT | Performed by: PHYSICIAN ASSISTANT

## 2023-01-14 PROCEDURE — 70450 CT HEAD/BRAIN W/O DYE: CPT

## 2023-01-14 PROCEDURE — 99284 EMERGENCY DEPT VISIT MOD MDM: CPT

## 2023-01-14 ASSESSMENT — ENCOUNTER SYMPTOMS
COUGH: 0
SHORTNESS OF BREATH: 0
NAUSEA: 0
ABDOMINAL PAIN: 0
SORE THROAT: 0
BACK PAIN: 0
VOMITING: 0

## 2023-01-14 ASSESSMENT — PAIN SCALES - GENERAL: PAINLEVEL_OUTOF10: 0

## 2023-01-14 ASSESSMENT — PAIN - FUNCTIONAL ASSESSMENT: PAIN_FUNCTIONAL_ASSESSMENT: 0-10

## 2023-01-14 NOTE — ED TRIAGE NOTES
Pt to ED ambulatory to triage without difficulty with c/o dizziness for the past couple of days. Pt states recently recovering from the flu x2 wks. Pt states dizziness when standing and is intermittent in nature. Pt denies any falls or visual disturbances.

## 2023-01-14 NOTE — DISCHARGE INSTRUCTIONS
Your work-up today was unremarkable. You feel like your symptoms are related to vertigo as well as some fluid depletion. Orally rehydrate with Gatorade and water today. Continue taking the meclizine. I have attached information on ear nose and throat. I would like you to contact them and schedule an appointment this week or next. Their information has been attached to your discharge paperwork. If you have worsening or worrisome symptoms develop then return here promptly for reevaluation.

## 2023-01-14 NOTE — ED NOTES
I have reviewed discharge instructions with the patient. The patient verbalized understanding. Patient left ED via Discharge Method: ambulatory to Home with self. Opportunity for questions and clarification provided. Patient given 0 scripts. To continue your aftercare when you leave the hospital, you may receive an automated call from our care team to check in on how you are doing. This is a free service and part of our promise to provide the best care and service to meet your aftercare needs.  If you have questions, or wish to unsubscribe from this service please call [phone]. Thank you for Choosing our [facility] Emergency Department.         iVcki Dupont RN  01/14/23 4853

## 2023-01-14 NOTE — ED PROVIDER NOTES
Emergency Department Provider Note                   PCP:                Eloisa Sosa MD               Age: 81 y.o. Sex: male       ICD-10-CM    1. Benign paroxysmal positional vertigo of right ear  H81.11       2. Dehydration  E86.0           DISPOSITION Decision To Discharge 01/14/2023 01:45:18 PM        Lori Encinas is a 81 y.o. male who presents to the Emergency Department with chief complaint of    Chief Complaint   Patient presents with    Dizziness      Patient is a 82-year-old male who presents this department with chief complaint of dizziness. This dizziness has been present for about 2 weeks. It started after having the flu. He reports having the symptoms before with a severe sinus infection. It did resolve on its own. This was many years ago. Most recently, he went to urgent care who diagnosed with vertigo and started him on meclizine. He reports his dizziness only occurs while going from a seated or lying position to standing. He reports the dizziness is short-lived and typically resolves within 1 to 2 minutes. He cannot elicit the dizziness any other way. After standing for long period time he does not have any dizziness. Denies sensation of feeling about to pass out. He has no falls or visual disturbances, no chest pain or diaphoresis or shortness of breath. The history is provided by the patient. No  was used. Review of Systems   Constitutional:  Negative for chills and fever. HENT:  Positive for congestion. Negative for sore throat. Respiratory:  Negative for cough and shortness of breath. Cardiovascular:  Negative for chest pain and palpitations. Gastrointestinal:  Negative for abdominal pain, nausea and vomiting. Genitourinary:  Negative for dysuria and flank pain. Musculoskeletal:  Negative for back pain. Skin:  Negative for rash. Neurological:  Positive for dizziness. Negative for headaches.    All other systems reviewed and are negative. Past Medical History:   Diagnosis Date    Cardiomyopathy in other disease 1/8/2013    Chest pain, unspecified 8/3/2016    Colon polyp 1/8/2013    Coronary atherosclerosis of native coronary vessel 8/3/2016    Dyslipidemia 8/3/2016    Essential hypertension 1/8/2013    HTN (hypertension) 1/8/2013    HTN (hypertension), benign 8/3/2016    The blood pressure readings have been in the target range. The patient is being seen by a primary care physician. No complications noted from the medication presently being used. Hypercholesterolemia 1/8/2013    Hyperlipidemia 8/3/2016       The patient's hyperlipidemia is stable. Recent laboratory work satisfactory. The patient is being seen by another professional.The patient is using statin drugs and is tolerating this well.      Pre-diabetes 1/8/2013        Past Surgical History:   Procedure Laterality Date    CARDIAC CATHETERIZATION  9/4/2008    [facility]    CATARACT REMOVAL Right 08/16/2021    Ritika Armando    CATARACT REMOVAL Left 08/30/2021    Ritika Armando    COLONOSCOPY  05/24/2021    Dr. Kailee Basurto; colon polyp, diverticulosis, internal hemorrhoid    COLONOSCOPY  7/15/15    Dr. Loraine Cooper; repeat in 5 years    COLONOSCOPY  April 24, 2009    Dr. Garrett Friedman; every 5 years    VASCULAR SURGERY  2019    venous LE surgery; Dr. Deana Orellana        Family History   Problem Relation Age of Onset    Heart Disease Father 45    Stroke Neg Hx     Heart Surgery Brother 62    Cancer Brother 79        colon cancer    Diabetes Brother     Diabetes Sister     Diabetes Mother         Social History     Socioeconomic History    Marital status:    Tobacco Use    Smoking status: Never    Smokeless tobacco: Current     Types: Snuff    Tobacco comments:     Quit smoking: dips   Vaping Use    Vaping Use: Never used   Substance and Sexual Activity    Alcohol use: Not Currently     Alcohol/week: 4.0 standard drinks     Types: 4 Cans of beer per week    Drug use: No     Social Determinants of Health Physical Activity: Inactive    Days of Exercise per Week: 0 days    Minutes of Exercise per Session: 0 min         Escitalopram and Valsartan     Discharge Medication List as of 1/14/2023  1:59 PM        CONTINUE these medications which have NOT CHANGED    Details   chlorthalidone (HYGROTON) 25 MG tablet Take 1 tablet by mouth daily, Disp-90 tablet, R-3Normal      Azilsartan Medoxomil (EDARBI) 80 MG TABS Take 1 tablet by mouth daily, Disp-90 tablet, R-3Normal      Omega-3 Fatty Acids (FISH OIL) 1000 MG CAPS Take 3,000 mg by mouth 4 times dailyHistorical Med      atorvastatin (LIPITOR) 40 MG tablet Take 1 tablet by mouth daily, Disp-90 tablet, R-3Normal      diclofenac sodium (VOLTAREN) 1 % GEL Apply topically 2 times daily, Topical, 2 TIMES DAILY, Historical Med      escitalopram (LEXAPRO) 10 MG tablet Take 1 tablet by mouth daily, Disp-90 tablet, R-3Normal      nebivolol (BYSTOLIC) 20 MG TABS tablet Take 1 tablet by mouth daily 1 p.o. each night for blood pressure, Disp-90 tablet, R-3Normal      aspirin 81 MG chewable tablet Take 81 mg by mouth dailyHistorical Med      cyanocobalamin 1000 MCG tablet Take 1,000 mcg by mouth dailyHistorical Med      folic acid (FOLVITE) 695 MCG tablet Take 800 mcg by mouth dailyHistorical Med      ibuprofen (ADVIL;MOTRIN) 600 MG tablet Take 600 mg by mouth every 8 hours as neededHistorical Med              Vitals signs and nursing note reviewed. No data found. Physical Exam  Vitals and nursing note reviewed. Constitutional:       General: He is not in acute distress. Appearance: Normal appearance. He is not ill-appearing, toxic-appearing or diaphoretic. HENT:      Head: Normocephalic and atraumatic. Nose: Nose normal.      Mouth/Throat:      Mouth: Mucous membranes are moist.   Eyes:      Pupils: Pupils are equal, round, and reactive to light. Cardiovascular:      Rate and Rhythm: Normal rate.    Pulmonary:      Effort: Pulmonary effort is normal. No respiratory distress. Abdominal:      General: Abdomen is flat. Palpations: Abdomen is soft. Tenderness: There is no abdominal tenderness. Musculoskeletal:         General: Normal range of motion. Cervical back: Normal range of motion. No rigidity. Skin:     General: Skin is warm. Neurological:      General: No focal deficit present. Mental Status: He is alert. Comments: Non-ataxic. Finger-to-nose, heel-to-shin without any difficulty. Psychiatric:         Mood and Affect: Mood normal.        Procedures    Medical Decision Making  Head CT is normal.  Patient was positive orthostatic. Feel that he is likely volume depleted as this dizziness only occurs when standing and resolves quickly. He will need to follow-up with primary care. Problems Addressed:  Benign paroxysmal positional vertigo of right ear: complicated acute illness or injury  Dehydration: acute illness or injury with systemic symptoms    Amount and/or Complexity of Data Reviewed  Radiology: ordered. ECG/medicine tests: ordered. Complexity of Problem: 1 undiagnosed new problem with uncertain prognosis. (4)  The patients assessment required an independent historian: I spoke with a family member. I have conducted an independent ordering and review of Labs. I have conducted an independent ordering and review of CT Scan. I have reviewed records from an external source: ED records from outside this hospital.  I have reviewed records from an external source: provider visit notes from outside specialist.  Considerations: Shared decision making was utilized in the care of this patient. Evidence based risk calculation performed: HEART score. We discussed care recommended by provider that patient declined (tests, disposition, etc). Patient was discharged risks and benefits of hospitalization were discussed.          Orders Placed This Encounter   Procedures    CT HEAD WO CONTRAST    EKG 12 Lead        Medications - No data to display    Discharge Medication List as of 1/14/2023  1:59 PM           Results for orders placed or performed during the hospital encounter of 01/14/23   CT HEAD WO CONTRAST    Narrative    EXAMINATION: CT HEAD WO CONTRAST 1/14/2023 12:02 PM    ACCESSION NUMBER: [id number]    COMPARISON: None available    INDICATION: Dizziness. TECHNIQUE: Multiple-row detector helical CT examination of the head without  intravenous contrast.     Radiation dose reduction techniques were used for this study. Our CT scanners  use one or all of the following: Automated exposure control, adjustment of the  mA and/or kV according to patient size, iterative reconstruction. FINDINGS:   There is no midline shift or mass lesion. There is no evidence of intracranial  hemorrhage or acute infarct. No fractures are evident. Complete opacification  of the right maxillary sinus with associated osseous wall thickening. The  remaining paranasal sinuses are pneumatized and free of fluid. Impression    1. No acute intracranial abnormality. 2.  Chronic right maxillary sinus disease with complete opacification. EKG 12 Lead   Result Value Ref Range    Ventricular Rate 56 BPM    Atrial Rate 56 BPM    P-R Interval 292 ms    QRS Duration 98 ms    Q-T Interval 414 ms    QTc Calculation (Bazett) 399 ms    P Axis 108 degrees    R Axis 12 degrees    T Axis 41 degrees    Diagnosis Sinus bradycardia with 1st degree A-V block         CT HEAD WO CONTRAST   Final Result      1. No acute intracranial abnormality. 2.  Chronic right maxillary sinus disease with complete opacification. Voice dictation software was used during the making of this note. This software is not perfect and grammatical and other typographical errors may be present. This note has not been completely proofread for errors.      REMINGTON Granados  01/14/23 [id number]

## 2023-01-16 ENCOUNTER — TELEPHONE (OUTPATIENT)
Dept: FAMILY MEDICINE CLINIC | Facility: CLINIC | Age: 73
End: 2023-01-16

## 2023-01-16 ENCOUNTER — TELEPHONE (OUTPATIENT)
Dept: ENT CLINIC | Age: 73
End: 2023-01-16

## 2023-01-16 DIAGNOSIS — R42 VERTIGO: Primary | ICD-10-CM

## 2023-01-16 NOTE — TELEPHONE ENCOUNTER
Pt was seen in the ER 1/14/23 for Benign paroxysmal positional vertigo of right ear. Wife called in today to make him an apt this week. I informed her that his next available was not until April and she asked if there was anything we could do to get him in sooner. I noticed he does not have many Hearing loss visits and was wondering what your advise is for future vertigo/ Hearing loss apts when he is on call. Please inform me or the patient if you make him an apt.

## 2023-01-16 NOTE — TELEPHONE ENCOUNTER
Pt wife called stating that pt went to the ER on 1/14/23 for Dizziness. States that pt has finished medication. States that Dr. Vishal Woods can not see him until April. Asking if we can put in another referral to another ENT for pt. Last OV 11/22/22.  Next OV 5/22/23

## 2023-01-17 NOTE — TELEPHONE ENCOUNTER
Since audio's are backed up into April the patient can either:    Obtain the audiogram externally and bring with him to his appt or see if ignacio can has a cancellation or a work in. Patient would need to have an audiogram in order to be seen. We can see them in the next available new patient slot in [place] or [facility].

## 2023-01-25 ENCOUNTER — OFFICE VISIT (OUTPATIENT)
Dept: ORTHOPEDIC SURGERY | Age: 73
End: 2023-01-25

## 2023-01-25 DIAGNOSIS — M25.511 RIGHT SHOULDER PAIN, UNSPECIFIED CHRONICITY: Primary | ICD-10-CM

## 2023-01-25 RX ORDER — METHYLPREDNISOLONE ACETATE 40 MG/ML
80 INJECTION, SUSPENSION INTRA-ARTICULAR; INTRALESIONAL; INTRAMUSCULAR; SOFT TISSUE ONCE
Status: COMPLETED | OUTPATIENT
Start: 2023-01-25 | End: 2023-01-25

## 2023-01-25 RX ADMIN — METHYLPREDNISOLONE ACETATE 80 MG: 40 INJECTION, SUSPENSION INTRA-ARTICULAR; INTRALESIONAL; INTRAMUSCULAR; SOFT TISSUE at 15:42

## 2023-01-25 NOTE — PATIENT INSTRUCTIONS
Patient Education        Rotator Cuff: Exercises  Introduction  Here are some examples of exercises for you to try. The exercises may be suggested for a condition or for rehabilitation. Start each exercise slowly. Ease off the exercises if you start to have pain. You will be told when to start these exercises and which ones will work best for you. How to do the exercises  Pendulum swing  If you have pain in your back, do not do this exercise. Hold on to a table or the back of a chair with your good arm. Then bend forward a little and let your sore arm hang straight down. This exercise does not use the arm muscles. Rather, use your legs and your hips to create movement that makes your arm swing freely. Use the movement from your hips and legs to guide the slightly swinging arm back and forth like a pendulum (or elephant trunk). Then guide it in circles that start small (about the size of a dinner plate). Make the circles a bit larger each day, as your pain allows. Do this exercise for 5 minutes, 5 to 7 times each day. As you have less pain, try bending over a little farther to do this exercise. This will increase the amount of movement at your shoulder. Posterior stretching exercise  Hold the elbow of your injured arm with your other hand. Use your hand to pull your injured arm gently up and across your body. You will feel a gentle stretch across the back of your injured shoulder. Hold for at least 15 to 30 seconds. Then slowly lower your arm. Repeat 2 to 4 times. Up-the-back stretch  Your doctor or physical therapist may want you to wait to do this stretch until you have regained most of your range of motion and strength. You can do this stretch in different ways. Hold any of these stretches for at least 15 to 30 seconds. Repeat them 2 to 4 times. Light stretch: Put your hand in your back pocket. Let it rest there to stretch your shoulder. Moderate stretch:  With your other hand, hold your injured arm (palm outward) behind your back by the wrist. Pull your arm up gently to stretch your shoulder. Advanced stretch: Put a towel over your other shoulder. Put the hand of your injured arm behind your back. Now hold the back end of the towel. With the other hand, hold the front end of the towel in front of your body. Pull gently on the front end of the towel. This will bring your hand farther up your back to stretch your shoulder. Overhead stretch  Standing about an arm's length away, grasp onto a solid surface. You could use a countertop, a doorknob, or the back of a sturdy chair. With your knees slightly bent, bend forward with your arms straight. Lower your upper body, and let your shoulders stretch. As your shoulders are able to stretch farther, you may need to take a step or two backward. Hold for at least 15 to 30 seconds. Then stand up and relax. If you had stepped back during your stretch, step forward so you can keep your hands on the solid surface. Repeat 2 to 4 times. Shoulder flexion (lying down)  To make a wand for this exercise, use a piece of PVC pipe or a broom handle with the broom removed. Make the wand about a foot wider than your shoulders. Lie on your back, holding a wand with both hands. Your palms should face down as you hold the wand. Keeping your elbows straight, slowly raise your arms over your head. Raise them until you feel a stretch in your shoulders, upper back, and chest.  Hold for 15 to 30 seconds. Repeat 2 to 4 times. Shoulder rotation (lying down)  To make a wand for this exercise, use a piece of PVC pipe or a broom handle with the broom removed. Make the wand about a foot wider than your shoulders. Lie on your back. Hold a wand with both hands with your elbows bent and palms up. Keep your elbows close to your body, and move the wand across your body toward the sore arm. Hold for 8 to 12 seconds. Repeat 2 to 4 times.   Wall climbing (to the side)  Avoid any movement that is straight to your side, and be careful not to arch your back. Your arm should stay about 30 degrees to the front of your side. Stand with your side to a wall so that your fingers can just touch it at an angle about 30 degrees toward the front of your body. Walk the fingers of your injured arm up the wall as high as pain permits. Try not to shrug your shoulder up toward your ear as you move your arm up. Hold that position for a count of at least 15 to 20. Walk your fingers back down to the starting position. Repeat at least 2 to 4 times. Try to reach higher each time. Wall climbing (to the front)  During this stretching exercise, be careful not to arch your back. Face a wall, and stand so your fingers can just touch it. Keeping your shoulder down, walk the fingers of your injured arm up the wall as high as pain permits. (Don't shrug your shoulder up toward your ear.)  Hold your arm in that position for at least 15 to 30 seconds. Slowly walk your fingers back down to where you started. Repeat at least 2 to 4 times. Try to reach higher each time. Shoulder blade squeeze  Stand with your arms at your sides, and squeeze your shoulder blades together. Do not raise your shoulders up as you squeeze. Hold 6 seconds. Repeat 8 to 12 times. Scapular exercise: Arm reach  Lie flat on your back. This exercise is a very slight motion that starts with your arms raised (elbows straight, arms straight). From this position, reach higher toward the niesha or ceiling. Keep your elbows straight. All motion should be from your shoulder blade only. Relax your arms back to where you started. Repeat 8 to 12 times. Arm raise to the side  During this strengthening exercise, your arm should stay about 30 degrees to the front of your side. Slowly raise your injured arm to the side, with your thumb facing up. Raise your arm 60 degrees at the most (shoulder level is 90 degrees). Hold the position for 3 to 5 seconds.  Then lower your arm back to your side. If you need to, bring your \"good\" arm across your body and place it under the elbow as you lower your injured arm. Use your good arm to keep your injured arm from dropping down too fast.  Repeat 8 to 12 times. When you first start out, don't hold any extra weight in your hand. As you get stronger, you may use a 1-pound to 2-pound dumbbell or a small can of food. Shoulder flexor and extensor exercise  These are isometric exercises. That means you contract your muscles without actually moving. Push forward (flex): Stand facing a wall or doorjamb, about 6 inches or less back. Hold your injured arm against your body. Make a closed fist with your thumb on top. Then gently push your hand forward into the wall with about 25% to 50% of your strength. Don't let your body move backward as you push. Hold for about 6 seconds. Relax for a few seconds. Repeat 8 to 12 times. Push backward (extend): Stand with your back flat against a wall. Your upper arm should be against the wall, with your elbow bent 90 degrees (your hand straight ahead). Push your elbow gently back against the wall with about 25% to 50% of your strength. Don't let your body move forward as you push. Hold for about 6 seconds. Relax for a few seconds. Repeat 8 to 12 times. Scapular exercise: Wall push-ups  This exercise is best done with your fingers somewhat turned out, rather than straight up and down. Stand facing a wall, about 12 inches to 18 inches away. Place your hands on the wall at shoulder height. Slowly bend your elbows and bring your face to the wall. Keep your back and hips straight. Push back to where you started. Repeat 8 to 12 times. When you can do this exercise against a wall comfortably, you can try it against a counter. You can then slowly progress to the end of a couch, then to a sturdy chair, and finally to the floor.   Scapular exercise: Retraction  For this exercise, you will need elastic exercise material, such as surgical tubing or Thera-Band. Put the band around a solid object at about waist level. (A bedpost will work well.) Each hand should hold an end of the band. With your elbows at your sides and bent to 90 degrees, pull the band back. Your shoulder blades should move toward each other. Then move your arms back where you started. Repeat 8 to 12 times. If you have good range of motion in your shoulders, try this exercise with your arms lifted out to the sides. Keep your elbows at a 90-degree angle. Raise the elastic band up to about shoulder level. Pull the band back to move your shoulder blades toward each other. Then move your arms back where you started. Internal rotator strengthening exercise  Start by tying a piece of elastic exercise material to a doorknob. You can use surgical tubing or Thera-Band. Stand or sit with your shoulder relaxed and your elbow bent 90 degrees. Your upper arm should rest comfortably against your side. Squeeze a rolled towel between your elbow and your body for comfort. This will help keep your arm at your side. Hold one end of the elastic band in the hand of the painful arm. Slowly rotate your forearm toward your body until it touches your belly. Slowly move it back to where you started. Keep your elbow and upper arm firmly tucked against the towel roll or at your side. Repeat 8 to 12 times. External rotator strengthening exercise  Start by tying a piece of elastic exercise material to a doorknob. You can use surgical tubing or Thera-Band. (You may also hold one end of the band in each hand.)  Stand or sit with your shoulder relaxed and your elbow bent 90 degrees. Your upper arm should rest comfortably against your side. Squeeze a rolled towel between your elbow and your body for comfort. This will help keep your arm at your side. Hold one end of the elastic band with the hand of the painful arm. Start with your forearm across your belly.  Slowly rotate the forearm out away from your body. Keep your elbow and upper arm tucked against the towel roll or the side of your body until you begin to feel tightness in your shoulder. Slowly move your arm back to where you started. Repeat 8 to 12 times. Follow-up care is a key part of your treatment and safety. Be sure to make and go to all appointments, and call your doctor if you are having problems. It's also a good idea to know your test results and keep a list of the medicines you take. Current as of: March 9, 2022               Content Version: 13.5  © 2006-2022 [employer]. Care instructions adapted under license by [facility] ([facility]). If you have questions about a medical condition or this instruction, always ask your healthcare professional. [address] any warranty or liability for your use of this information.

## 2023-01-25 NOTE — PROGRESS NOTES
Name: Karan Gaines  YOB: 1950  Gender: male  MRN: [id number]    CC:   Chief Complaint   Patient presents with    Shoulder Pain     Left shoulder   Left shoulder pain    HPI:  This patient presents with an acute on chronic 2-month history of Left shoulder pain. Patient states that he tripped on some cement which aggravated his shoulder. Patient notes pain located in the shoulder and into the lateral aspect. He does note some popping and clicking. Denies numbness, tingling, changes in strength. 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 He states overall he said some chronic pain here. Feels it from the shoulder to the elbow. Denies numbness or tingling. Takes aspirin on occasion. Allergies   Allergen Reactions    Escitalopram Other (See Comments)     Upset stomach and felt poorly    Valsartan Other (See Comments)     Felt like he was having crazy thoughts and lightheadedness     Past Medical History:   Diagnosis Date    Cardiomyopathy in other disease 1/8/2013    Chest pain, unspecified 8/3/2016    Colon polyp 1/8/2013    Coronary atherosclerosis of native coronary vessel 8/3/2016    Dyslipidemia 8/3/2016    Essential hypertension 1/8/2013    HTN (hypertension) 1/8/2013    HTN (hypertension), benign 8/3/2016    The blood pressure readings have been in the target range. The patient is being seen by a primary care physician. No complications noted from the medication presently being used. Hypercholesterolemia 1/8/2013    Hyperlipidemia 8/3/2016       The patient's hyperlipidemia is stable. Recent laboratory work satisfactory. The patient is being seen by another professional.The patient is using statin drugs and is tolerating this well.      Pre-diabetes 1/8/2013     Past Surgical History:   Procedure Laterality Date    CARDIAC CATHETERIZATION  9/4/2008    [facility]    CATARACT REMOVAL Right 08/16/2021    [facility]    CATARACT REMOVAL Left 08/30/2021    Isabelle Morrissey    COLONOSCOPY  05/24/2021    Dr. Verner Grana; colon polyp, diverticulosis, internal hemorrhoid    COLONOSCOPY  7/15/15    Dr. Antonieta Workman; repeat in 5 years    COLONOSCOPY  April 24, 2009    Dr. Mayi Javier; every 5 years    VASCULAR SURGERY  2019    venous LE surgery; Dr. Shah Favorite     Family History   Problem Relation Age of Onset    Heart Disease Father 45    Stroke Neg Hx     Heart Surgery Brother 62    Cancer Brother 79        colon cancer    Diabetes Brother     Diabetes Sister     Diabetes Mother      Social History     Socioeconomic History    Marital status:      Spouse name: Not on file    Number of children: Not on file    Years of education: Not on file    Highest education level: Not on file   Occupational History    Not on file   Tobacco Use    Smoking status: Never    Smokeless tobacco: Current     Types: Snuff    Tobacco comments:     Quit smoking: dips   Vaping Use    Vaping Use: Never used   Substance and Sexual Activity    Alcohol use: Not Currently     Alcohol/week: 4.0 standard drinks     Types: 4 Cans of beer per week    Drug use: No    Sexual activity: Not on file   Other Topics Concern    Not on file   Social History Narrative    Not on file     Social Determinants of Health     Financial Resource Strain: Not on file   Food Insecurity: Not on file   Transportation Needs: Not on file   Physical Activity: Inactive    Days of Exercise per Week: 0 days    Minutes of Exercise per Session: 0 min   Stress: Not on file   Social Connections: Not on file   Intimate Partner Violence: Not on file   Housing Stability: Not on file        No flowsheet data found. Review of Systems  hypertension, coronary artery disease, history of DVT     PE:    GEN: General appearance is that of a healthy patient, alert and oriented, in no distress. WDWN. HEENT:  Normocephalic, atraumatic. Extraocular muscles are intact. Neck:  Supple.    Heart:  Regular pulse exam on all extremities. Good color and warmth noted. Lungs:  Normal non-labored breathing with no obvious shortness of breath. Abdomen:  WNL's. Skin:  No signs of rash or bruising. Pysch: Answers questions appropriately, AO X 3. MSK:  Cervical spine: No tenderness. Decreased range of motion of the cervical spine without pain    SHOULDER   Left (Involved) Right   Skin Intact Intact   Radial Pulses 2+ Symmetrical 2+ Symmetrical   Deformity None Normal   Myotomes Normal Normal   Dermatomes  Normal Normal    ROM Has decreased external rotation but flexion and abduction and internal rotation are fairly symmetric Full   Strength Weak with external rotation. And rotation is normal scapular plane testing is not as painful but is still weak No weakness   Atrophy None noted None noted   Effusion/Swelling  None noted None noted   Palpation Minimally TTP at the shoulder No tenderness   Bicep Tendon Rupture  Negative Negative signs   Bear Hug, Belly Press Negative, negative Negative   Crossed Arm Adduction Test normal    Instability/Ant. Apprehension Test None noted None noted   Impingement Positive mild Neer, villafana, AOM Negative      X-rays:   Grashey, axillary and outlet views of the Left shoulder that were obtained and reviewed today in the office, show a type II acromion. The humeral head is slightly impinged upon by the acromion which is downsloping. No evidence of fracture, arthritis, dislocation or loose body. Moderate to severe degenerative changes of the A-C joint. Impression: Left shoulder appears normal except for AC arthritis and some lateral acromial overhang    Assessment:     ICD-10-CM    1.  Right shoulder pain, unspecified chronicity  M25.511 methylPREDNISolone acetate (DEPO-MEDROL) injection 80 mg     DRAIN/INJECT LARGE JOINT/BURSA     Ambulatory referral to Physical Therapy     CANCELED: XR SHOULDER RIGHT (MIN 2 VIEWS)          Plan: I had a long discussion with the patient regarding the natural history of the problem, as well as treatment options. Suggest trial of steroid injection and some formal therapy. I do not think this was a traumatic injury given his history. Treatment:     Recommend therapy to evaluate and treat current complaints and pathology. A home exercise program was also discussed with the patient. Procedure note: After discussion of risks and benefits including, but not limited to pain, infection, steroid flare, increased blood sugar, fat necrosis, skin discoloration, and injury to blood vessels or nerves, the patient verbally consented to proceed with a subacromial injection. They understand that we are proceeding with this in a way to avoid proceeding with more definitive major surgical options. The affected right shoulder was sterilely prepped in standard fashion and injected with 2 cc of Depo-Medrol (40mg/ml), 2 cc of 1% Lidocaine, and 2 cc of 0.5% Marcaine into the subacromial space. The patient tolerated the injection well. Return in about 6 weeks (around 3/8/2023). Thank you for the opportunity to see your patient. If you have any questions please give me a call.   Sincerely,  Iraida Tavares MD  01/25/23

## 2023-03-06 ENCOUNTER — APPOINTMENT (OUTPATIENT)
Dept: GENERAL RADIOLOGY | Age: 73
End: 2023-03-06
Payer: MEDICARE

## 2023-03-06 ENCOUNTER — HOSPITAL ENCOUNTER (EMERGENCY)
Age: 73
Discharge: HOME | End: 2023-03-07
Attending: EMERGENCY MEDICINE
Payer: MEDICARE

## 2023-03-06 DIAGNOSIS — I95.1 ORTHOSTATIC SYNCOPE: Primary | ICD-10-CM

## 2023-03-06 DIAGNOSIS — E87.6 HYPOKALEMIA: ICD-10-CM

## 2023-03-06 DIAGNOSIS — I95.1 ORTHOSTATIC HYPOTENSION: ICD-10-CM

## 2023-03-06 DIAGNOSIS — E86.0 DEHYDRATION: ICD-10-CM

## 2023-03-06 DIAGNOSIS — Z78.9 ALCOHOL USE: ICD-10-CM

## 2023-03-06 LAB
ALBUMIN SERPL-MCNC: 3.3 G/DL (ref 3.2–4.6)
ALBUMIN/GLOB SERPL: 1.2 (ref 0.4–1.6)
ALP SERPL-CCNC: 74 U/L (ref 50–136)
ALT SERPL-CCNC: 26 U/L (ref 12–65)
ANION GAP SERPL CALC-SCNC: 7 MMOL/L (ref 2–11)
AST SERPL-CCNC: 16 U/L (ref 15–37)
BASOPHILS # BLD: 0 K/UL (ref 0–0.2)
BASOPHILS NFR BLD: 1 % (ref 0–2)
BILIRUB SERPL-MCNC: 0.4 MG/DL (ref 0.2–1.1)
BUN SERPL-MCNC: 21 MG/DL (ref 8–23)
CALCIUM SERPL-MCNC: 7.9 MG/DL (ref 8.3–10.4)
CHLORIDE SERPL-SCNC: 102 MMOL/L (ref 101–110)
CO2 SERPL-SCNC: 24 MMOL/L (ref 21–32)
CREAT SERPL-MCNC: 1.2 MG/DL (ref 0.8–1.5)
DIFFERENTIAL METHOD BLD: ABNORMAL
EOSINOPHIL # BLD: 0.2 K/UL (ref 0–0.8)
EOSINOPHIL NFR BLD: 4 % (ref 0.5–7.8)
ERYTHROCYTE [DISTWIDTH] IN BLOOD BY AUTOMATED COUNT: 13.3 % (ref 11.9–14.6)
GLOBULIN SER CALC-MCNC: 2.7 G/DL (ref 2.8–4.5)
GLUCOSE SERPL-MCNC: 134 MG/DL (ref 65–100)
HCT VFR BLD AUTO: 30.6 % (ref 41.1–50.3)
HGB BLD-MCNC: 10.9 G/DL (ref 13.6–17.2)
IMM GRANULOCYTES # BLD AUTO: 0 K/UL (ref 0–0.5)
IMM GRANULOCYTES NFR BLD AUTO: 0 % (ref 0–5)
LYMPHOCYTES # BLD: 2.4 K/UL (ref 0.5–4.6)
LYMPHOCYTES NFR BLD: 39 % (ref 13–44)
MAGNESIUM SERPL-MCNC: 1.8 MG/DL (ref 1.8–2.4)
MCH RBC QN AUTO: 34.4 PG (ref 26.1–32.9)
MCHC RBC AUTO-ENTMCNC: 35.6 G/DL (ref 31.4–35)
MCV RBC AUTO: 96.5 FL (ref 82–102)
MONOCYTES # BLD: 0.6 K/UL (ref 0.1–1.3)
MONOCYTES NFR BLD: 9 % (ref 4–12)
NEUTS SEG # BLD: 3 K/UL (ref 1.7–8.2)
NEUTS SEG NFR BLD: 47 % (ref 43–78)
NRBC # BLD: 0 K/UL (ref 0–0.2)
PLATELET # BLD AUTO: 182 K/UL (ref 150–450)
PMV BLD AUTO: 9.4 FL (ref 9.4–12.3)
POTASSIUM SERPL-SCNC: 2.9 MMOL/L (ref 3.5–5.1)
PROT SERPL-MCNC: 6 G/DL (ref 6.3–8.2)
RBC # BLD AUTO: 3.17 M/UL (ref 4.23–5.6)
SODIUM SERPL-SCNC: 133 MMOL/L (ref 133–143)
WBC # BLD AUTO: 6.3 K/UL (ref 4.3–11.1)

## 2023-03-06 PROCEDURE — 85025 COMPLETE CBC W/AUTO DIFF WBC: CPT

## 2023-03-06 PROCEDURE — 93005 ELECTROCARDIOGRAM TRACING: CPT | Performed by: EMERGENCY MEDICINE

## 2023-03-06 PROCEDURE — 96361 HYDRATE IV INFUSION ADD-ON: CPT

## 2023-03-06 PROCEDURE — 83735 ASSAY OF MAGNESIUM: CPT

## 2023-03-06 PROCEDURE — 99285 EMERGENCY DEPT VISIT HI MDM: CPT

## 2023-03-06 PROCEDURE — 2580000003 HC RX 258: Performed by: EMERGENCY MEDICINE

## 2023-03-06 PROCEDURE — 96360 HYDRATION IV INFUSION INIT: CPT

## 2023-03-06 PROCEDURE — 80053 COMPREHEN METABOLIC PANEL: CPT

## 2023-03-06 PROCEDURE — 71045 X-RAY EXAM CHEST 1 VIEW: CPT

## 2023-03-06 RX ORDER — 0.9 % SODIUM CHLORIDE 0.9 %
500 INTRAVENOUS SOLUTION INTRAVENOUS
Status: COMPLETED | OUTPATIENT
Start: 2023-03-06 | End: 2023-03-07

## 2023-03-06 RX ORDER — SODIUM CHLORIDE, SODIUM LACTATE, POTASSIUM CHLORIDE, AND CALCIUM CHLORIDE .6; .31; .03; .02 G/100ML; G/100ML; G/100ML; G/100ML
1000 INJECTION, SOLUTION INTRAVENOUS ONCE
Status: COMPLETED | OUTPATIENT
Start: 2023-03-06 | End: 2023-03-07

## 2023-03-06 RX ADMIN — SODIUM CHLORIDE 500 ML: 9 INJECTION, SOLUTION INTRAVENOUS at 22:47

## 2023-03-06 RX ADMIN — SODIUM CHLORIDE, POTASSIUM CHLORIDE, SODIUM LACTATE AND CALCIUM CHLORIDE 1000 ML: 600; 310; 30; 20 INJECTION, SOLUTION INTRAVENOUS at 22:46

## 2023-03-06 ASSESSMENT — PAIN SCALES - GENERAL: PAINLEVEL_OUTOF10: 0

## 2023-03-06 ASSESSMENT — PAIN - FUNCTIONAL ASSESSMENT: PAIN_FUNCTIONAL_ASSESSMENT: 0-10

## 2023-03-07 VITALS
HEART RATE: 55 BPM | OXYGEN SATURATION: 98 % | BODY MASS INDEX: 29.62 KG/M2 | WEIGHT: 200 LBS | RESPIRATION RATE: 18 BRPM | DIASTOLIC BLOOD PRESSURE: 70 MMHG | SYSTOLIC BLOOD PRESSURE: 122 MMHG | HEIGHT: 69 IN | TEMPERATURE: 97.8 F

## 2023-03-07 LAB
EKG ATRIAL RATE: 61 BPM
EKG DIAGNOSIS: NORMAL
EKG P AXIS: 0 DEGREES
EKG P-R INTERVAL: 78 MS
EKG Q-T INTERVAL: 453 MS
EKG QRS DURATION: 112 MS
EKG QTC CALCULATION (BAZETT): 468 MS
EKG R AXIS: 13 DEGREES
EKG T AXIS: 33 DEGREES
EKG VENTRICULAR RATE: 64 BPM

## 2023-03-07 PROCEDURE — 6370000000 HC RX 637 (ALT 250 FOR IP): Performed by: EMERGENCY MEDICINE

## 2023-03-07 RX ADMIN — POTASSIUM BICARBONATE 40 MEQ: 782 TABLET, EFFERVESCENT ORAL at 02:07

## 2023-03-07 NOTE — DISCHARGE INSTRUCTIONS
Please return for any questions, concerns or worsening symptoms, particularly increasing/unremitting chest pain, fainting episodes, heart palpitations, lightheadedness, shortness of breath, development of bloody or black bowel movements.

## 2023-03-07 NOTE — ED NOTES
I have reviewed discharge instructions with the patient. The patient verbalized understanding. Patient left ED via Discharge Method: ambulatory to Home with wife    Opportunity for questions and clarification provided. Patient given 0 scripts. To continue your aftercare when you leave the hospital, you may receive an automated call from our care team to check in on how you are doing. This is a free service and part of our promise to provide the best care and service to meet your aftercare needs.  If you have questions, or wish to unsubscribe from this service please call [phone]. Thank you for Choosing our [facility].         Ofe Dunn [facility]  03/07/23 [id number]

## 2023-03-07 NOTE — ED PROVIDER NOTES
Emergency Department Provider Note                   PCP:                Grace Rubalcava MD               Age: 81 y.o. Sex: male     Final diagnosis/impression:  1. Orthostatic syncope    2. Orthostatic hypotension    3. Alcohol use    4. Dehydration    5. Hypokalemia         Disposition: Discharge    MDM/Clinical Course:  Patient seen by myself at the [address] [place] [address] emergency department. History was collected from the patient as well as EMS. In consideration of patient problem this represents an acute, potentially life-threatening problem. Patient had signs symptoms and clinical history most consistent with multiple syncopal episodes in the context of alcohol consumption, poor fluid intake throughout the day. Work-up considered but not performed includes troponin testing as patient has no chest pain or palpitation symptoms preceding, no recent exertional intolerance, shortness of breath or exertionally related chest pain symptoms, patient with what sounds like orthostatic hypotension in the context of drinking alcohol, poor water intake and potentially taking an extra dose of blood pressure medications. My independent analysis/interpretation of laboratory work-up here shows CBC shows hemoglobin at 10.9/near baseline, white blood cell count is 6.3, CMP shows mild hypokalemia at 2.9, BUN/creatinine at baseline, calcium borderline low at 7.9 but not felt contributory towards patient presentation. Patient blood pressure improving over the course of the ER visit and patient without pain or other complaints. EKG is unremarkable for acute/emergent abnormality. It is unclear if patient took extra dose of blood pressure medication or not as patient is very nonspecific about this, he does not appear to be having signs of any type of overdose associated with additional blood pressure medication.   I did consider the possibility of hospitalization however patient is hemodynamically appropriate, wishes to go home and I have found no acute/emergent abnormality on laboratory testing at this time. I recommended rest, close monitoring of symptoms, low threshold to return as needed. I recommended follow-up with primary care physician. Patient/family given instructions to return as needed for any questions, concerns or worsening symptoms, particularly those as outlined in the disposition section / discharge section of patient discharge paperwork. Patient/family verbalizes understanding agreement with ED course/plan in shared medical decision making. Questions answered. While under my care, patient received 1.5 L IV fluids, had received 500 cc prior to arrival with improvement of blood pressure on arrival here. Complexity of Problems Addressed:  1 or more acute illnesses that pose a threat to life or bodily function. Data Reviewed and Analyzed:  Category 1:   I reviewed records from an external source: provider visit notes from PCP. I reviewed records from an external source: Reviewed April 2022 stress echocardiogram echocardiogram showing normal ejection fraction (55 to 60%), also reviewed primary care physician office visit from 10/10/2022  I ordered each unique test.  I reviewed the results of each unique test.        Category 2:     ED EKG was independently interpreted in the absence of a cardiologist.  Rate: 64  EKG Interpretation: Reviewed on 03/6/23 at 2216:  Incomplete right bundle branch block, normal sinus rhythm, rate of 64, no ST elevation MI or other gross signs of cardiac ischemia, QTc is 468 and is not prolonged, QRS is 112 and is not widened. WA interval is 78 and is borderline shortened, no delta wave present, no epsilon wave present    Radiology:  My independent chest x-ray review shows no pneumothorax, no pneumonia, no aortic widening, no noted focal consolidation.   Radiology read shows no acute abnormality but notes some cardiomegaly    Category 3: Discussion of management or test interpretation. See MDM / clinical course section above for details    Risk of Complications and/or Morbidity of Patient Management:  Patient was discharged risks and benefits of hospitalization were considered   Shared medical decision making performed with patient/family during course of ER visit and a determination of disposition whenever appropriate. Orders Placed This Encounter   Procedures    XR CHEST PORTABLE    CBC with Auto Differential    CMP    Magnesium    EKG 12 Lead    Saline lock IV      ED Meds Given:  Medications   potassium bicarb-citric acid (EFFER-K) effervescent tablet 40 mEq (has no administration in time range)   lactated ringers bolus (1,000 mLs IntraVENous New Bag 3/6/23 1136)   0.9 % sodium chloride bolus (500 mLs IntraVENous New Bag 3/6/23 2247)     New Prescriptions    No medications on file         ___________________    HPI: Enrike Vegas is a 81 y.o. male with past medical history of hypertension, hyperlipidemia, hypercholesterolemia presenting for evaluation of syncopal episode. Patient states he has drank upwards of 4 beers throughout the day and had very poor oral intake. EMS was called for syncopal episode after patient felt lightheaded after getting up from sitting down and had a syncopal episode. No reported head injury. No myoclonic or other seizure-like activity. On EMSs arrival, patient noted to be hypotensive at 81/53, worsening to lower blood pressure on standing with subsequent syncopal episode without head injury. Patient denies any pain symptoms, including no chest pain, abdominal pain, pelvic, neck pain. Patient denies any hematochezia or or melena, denies any recent shortness of breath, palpitations, chest pain, exertional intolerance or other similar symptoms.   Patient states he may have taken an extra dose of his hypertension medications, he takes chlorthalidone as well as Bystolic, he is unsure which one he took an extra dose of and he is unsure if he did in fact take an extra dose or not. Patient denies any excessive accidental ingestion of medication beyond the quantity of 1 blood pressure tablet. Patient/family denies any other evaluation for today's acute complaint. Patient/family denies any other aggravating or alleviating factors. Patient/family denies any other symptoms. ROS:   All review of systems negative except as noted above in the history of the present illness. Past Medical/ Family/ Social History:     Medical history:   Past Medical History:   Diagnosis Date    Cardiomyopathy in other disease 1/8/2013    Chest pain, unspecified 8/3/2016    Colon polyp 1/8/2013    Coronary atherosclerosis of native coronary vessel 8/3/2016    Dyslipidemia 8/3/2016    Essential hypertension 1/8/2013    HTN (hypertension) 1/8/2013    HTN (hypertension), benign 8/3/2016    The blood pressure readings have been in the target range. The patient is being seen by a primary care physician. No complications noted from the medication presently being used. Hypercholesterolemia 1/8/2013    Hyperlipidemia 8/3/2016       The patient's hyperlipidemia is stable. Recent laboratory work satisfactory. The patient is being seen by another professional.The patient is using statin drugs and is tolerating this well.      Pre-diabetes 1/8/2013       Surgical history:   Past Surgical History:   Procedure Laterality Date    CARDIAC CATHETERIZATION  9/4/2008    [facility]    CATARACT REMOVAL Right 08/16/2021    Austine Big    CATARACT REMOVAL Left 08/30/2021    [facility]ine Big    COLONOSCOPY  05/24/2021    Dr. Horace Hill; colon polyp, diverticulosis, internal hemorrhoid    COLONOSCOPY  7/15/15    Dr. Rosalinda Arreola; repeat in 5 years    COLONOSCOPY  April 24, 2009    Dr. Mamadou Landis; every 5 years    VASCULAR SURGERY  2019    venous LE surgery; Dr. Gene Palmer       Family history:   Family History   Problem Relation Age of Onset    Heart Disease Father 45    Stroke Neg Hx     Heart Surgery Brother 62    Cancer Brother 79 colon cancer    Diabetes Brother     Diabetes Sister     Diabetes Mother        Social history:   Social History     Socioeconomic History    Marital status:    Tobacco Use    Smoking status: Never    Smokeless tobacco: Current     Types: Snuff    Tobacco comments:     Quit smoking: dips   Vaping Use    Vaping Use: Never used   Substance and Sexual Activity    Alcohol use: Not Currently     Alcohol/week: 4.0 standard drinks     Types: 4 Cans of beer per week    Drug use: No     Social Determinants of Health     Physical Activity: Inactive    Days of Exercise per Week: 0 days    Minutes of Exercise per Session: 0 min        Medications:   Previous Medications    ASPIRIN 81 MG CHEWABLE TABLET    Take 81 mg by mouth daily    ATORVASTATIN (LIPITOR) 40 MG TABLET    Take 1 tablet by mouth daily    AZILSARTAN MEDOXOMIL (EDARBI) 80 MG TABS    Take 1 tablet by mouth daily    CHLORTHALIDONE (HYGROTON) 25 MG TABLET    Take 1 tablet by mouth daily    CYANOCOBALAMIN 1000 MCG TABLET    Take 1,000 mcg by mouth daily    DICLOFENAC SODIUM (VOLTAREN) 1 % GEL    Apply topically 2 times daily    ESCITALOPRAM (LEXAPRO) 10 MG TABLET    Take 1 tablet by mouth daily    FOLIC ACID (FOLVITE) 407 MCG TABLET    Take 800 mcg by mouth daily    IBUPROFEN (ADVIL;MOTRIN) 600 MG TABLET    Take 600 mg by mouth every 8 hours as needed    NEBIVOLOL (BYSTOLIC) 20 MG TABS TABLET    Take 1 tablet by mouth daily 1 p.o. each night for blood pressure    OMEGA-3 FATTY ACIDS (FISH OIL) 1000 MG CAPS    Take 3,000 mg by mouth 4 times daily        Allergies: Allergies   Allergen Reactions    Escitalopram Other (See Comments)     Upset stomach and felt poorly    Valsartan Other (See Comments)     Felt like he was having crazy thoughts and lightheadedness       Physical Exam   Vitals signs reviewed.    Patient Vitals for the past 4 hrs:   Temp Pulse Resp BP SpO2   03/07/23 0148 -- 55 18 122/70 98 %   03/06/23 2247 97.8 °F (36.6 °C) -- -- -- --   03/06/23 2218 -- 62 18 (!) 117/54 100 %       General: Alert and oriented ×4, no acute distress   Eyes: Anicteric, conjunctiva pink, PERRLA, EOMI  ENT: No nasal discharge, no gross nasal congestion present  Pulmonary: Clear to auscultation bilaterally with symmetric chest rise, no increased work of breathing, no accessory muscle use  Cardiovascular: Regular rate and rhythm, no rub or gallop appreciated on my exam  GI: Abdomen is soft, nontender, nondistended  Musculoskeletal: No obvious joint deformity or joint effusion, normal joint range of motion  Neuro: Cranial nerves II through VII grossly intact, strength and sensation is grossly intact in the upper and lower extremities bilaterally  Skin: Skin is warm and dry    Procedures    Results for orders placed or performed during the hospital encounter of 03/06/23   XR CHEST PORTABLE    Narrative    EXAMINATION: XR CHEST PORTABLE      DATE OF EXAM: 3/6/2023 10:45 PM    HISTORY: Syncope    COMPARISON: None. FINDINGS:     Lungs are clear. Cardiomegaly. Bones and upper abdomen are normal.        Impression    1. No acute cardiopulmonary abnormality. Lungs are clear. 2.  Cardiomegaly.       Darshan Raphael M.D.   3/6/2023 11:32:00 PM   CBC with Auto Differential   Result Value Ref Range    WBC 6.3 4.3 - 11.1 K/uL    RBC 3.17 (L) 4.23 - 5.6 M/uL    Hemoglobin 10.9 (L) 13.6 - 17.2 g/dL    Hematocrit 30.6 (L) 41.1 - 50.3 %    MCV 96.5 82 - 102 FL    MCH 34.4 (H) 26.1 - 32.9 PG    MCHC 35.6 (H) 31.4 - 35.0 g/dL    RDW 13.3 11.9 - 14.6 %    Platelets 019 500 - 426 K/uL    MPV 9.4 9.4 - 12.3 FL    nRBC 0.00 0.0 - 0.2 K/uL    Differential Type AUTOMATED      Seg Neutrophils 47 43 - 78 %    Lymphocytes 39 13 - 44 %    Monocytes 9 4.0 - 12.0 %    Eosinophils % 4 0.5 - 7.8 %    Basophils 1 0.0 - 2.0 %    Immature Granulocytes 0 0.0 - 5.0 %    Segs Absolute 3.0 1.7 - 8.2 K/UL    Absolute Lymph # 2.4 0.5 - 4.6 K/UL    Absolute Mono # 0.6 0.1 - 1.3 K/UL    Absolute Eos # 0.2 0.0 - 0.8 K/UL    Basophils Absolute 0.0 0.0 - 0.2 K/UL    Absolute Immature Granulocyte 0.0 0.0 - 0.5 K/UL   CMP   Result Value Ref Range    Sodium 133 133 - 143 mmol/L    Potassium 2.9 (L) 3.5 - 5.1 mmol/L    Chloride 102 101 - 110 mmol/L    CO2 24 21 - 32 mmol/L    Anion Gap 7 2 - 11 mmol/L    Glucose 134 (H) 65 - 100 mg/dL    BUN 21 8 - 23 MG/DL    Creatinine 1.20 0.8 - 1.5 MG/DL    Est, Glom Filt Rate >60 >60 ml/min/1.73m2    Calcium 7.9 (L) 8.3 - 10.4 MG/DL    Total Bilirubin 0.4 0.2 - 1.1 MG/DL    ALT 26 12 - 65 U/L    AST 16 15 - 37 U/L    Alk Phosphatase 74 50 - 136 U/L    Total Protein 6.0 (L) 6.3 - 8.2 g/dL    Albumin 3.3 3.2 - 4.6 g/dL    Globulin 2.7 (L) 2.8 - 4.5 g/dL    Albumin/Globulin Ratio 1.2 0.4 - 1.6     Magnesium   Result Value Ref Range    Magnesium 1.8 1.8 - 2.4 mg/dL   EKG 12 Lead   Result Value Ref Range    Ventricular Rate 64 BPM    Atrial Rate 61 BPM    P-R Interval 78 ms    QRS Duration 112 ms    Q-T Interval 453 ms    QTc Calculation (Bazett) 468 ms    P Axis 0 degrees    R Axis 13 degrees    T Axis 33 degrees    Diagnosis Sinus rhythm      Bags, 500 and  XR CHEST PORTABLE   Final Result      1. No acute cardiopulmonary abnormality. Lungs are clear. 2.  Cardiomegaly. Ara Galvan M.D.    3/6/2023 11:32:00 PM                      Voice dictation software was used during the making of this note. This software is not perfect and grammatical and other typographical errors may be present. This note has not been completely proofread for errors.      Dario Hercules MD  03/07/23 3980

## 2023-03-07 NOTE — ED TRIAGE NOTES
Pt arrives via [facility] from home. C/O for hypertension and bradycardia. Syncopal episode. 70/40 BP. Positive orthostatics. Pt states HR normally low around 50's. ETOH on board, states possibly could have taken a double dose of his night BP medication. NAD.  Alert and oriented     Orthos   Sitting- 81/53 HR 61  Standing- 44/30 HR 51

## 2023-03-08 ENCOUNTER — OFFICE VISIT (OUTPATIENT)
Dept: ORTHOPEDIC SURGERY | Age: 73
End: 2023-03-08

## 2023-03-08 DIAGNOSIS — M75.41 IMPINGEMENT SYNDROME OF RIGHT SHOULDER: ICD-10-CM

## 2023-03-08 DIAGNOSIS — M25.511 RIGHT SHOULDER PAIN, UNSPECIFIED CHRONICITY: Primary | ICD-10-CM

## 2023-03-08 NOTE — PROGRESS NOTES
Name: Manuel Taylor  YOB: 1950  Gender: male  MRN: [id number]    CC:   Chief Complaint   Patient presents with    Follow-up     Recheck left shoulder          HPI: Patient presents for recheck of left shoulder pain. Patient has subacromial injection on 1- and we also discussed physical therapy. The patient had a 2-month history of shoulder pain after tripping on some cement which aggravated his shoulder. 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 He states some relief after the injection and with PT. States he feels about 25% better. He still has pain over left shoulder with movement as well as popping and clicking sounds. Denies numbness or tingling. Taking Tylenol PRN. Patient states he is right handed. Allergies   Allergen Reactions    Escitalopram Other (See Comments)     Upset stomach and felt poorly    Valsartan Other (See Comments)     Felt like he was having crazy thoughts and lightheadedness     Past Medical History:   Diagnosis Date    Cardiomyopathy in other disease 1/8/2013    Chest pain, unspecified 8/3/2016    Colon polyp 1/8/2013    Coronary atherosclerosis of native coronary vessel 8/3/2016    Dyslipidemia 8/3/2016    Essential hypertension 1/8/2013    HTN (hypertension) 1/8/2013    HTN (hypertension), benign 8/3/2016    The blood pressure readings have been in the target range. The patient is being seen by a primary care physician. No complications noted from the medication presently being used. Hypercholesterolemia 1/8/2013    Hyperlipidemia 8/3/2016       The patient's hyperlipidemia is stable. Recent laboratory work satisfactory. The patient is being seen by another professional.The patient is using statin drugs and is tolerating this well.      Pre-diabetes 1/8/2013     Past Surgical History:   Procedure Laterality Date    CARDIAC CATHETERIZATION  9/4/2008    [facility]    CATARACT REMOVAL Right 08/16/2021    [facility]    CATARACT REMOVAL Left 08/30/2021    [facility] Tseha    COLONOSCOPY  05/24/2021    Dr. Randall Queen; colon polyp, diverticulosis, internal hemorrhoid    COLONOSCOPY  7/15/15    Dr. Delon Turner; repeat in 5 years    COLONOSCOPY  April 24, 2009    Dr. Margeret Felty; every 5 years    VASCULAR SURGERY  2019    venous LE surgery; Dr. Scottie Sullivan     Family History   Problem Relation Age of Onset    Heart Disease Father 45    Stroke Neg Hx     Heart Surgery Brother 62    Cancer Brother 79        colon cancer    Diabetes Brother     Diabetes Sister     Diabetes Mother      Social History     Socioeconomic History    Marital status:      Spouse name: Not on file    Number of children: Not on file    Years of education: Not on file    Highest education level: Not on file   Occupational History    Not on file   Tobacco Use    Smoking status: Never    Smokeless tobacco: Current     Types: Snuff    Tobacco comments:     Quit smoking: dips   Vaping Use    Vaping Use: Never used   Substance and Sexual Activity    Alcohol use: Not Currently     Alcohol/week: 4.0 standard drinks     Types: 4 Cans of beer per week    Drug use: No    Sexual activity: Not on file   Other Topics Concern    Not on file   Social History Narrative    Not on file     Social Determinants of Health     Financial Resource Strain: Not on file   Food Insecurity: Not on file   Transportation Needs: Not on file   Physical Activity: Inactive    Days of Exercise per Week: 0 days    Minutes of Exercise per Session: 0 min   Stress: Not on file   Social Connections: Not on file   Intimate Partner Violence: Not on file   Housing Stability: Not on file        No flowsheet data found. Review of Systems  Non-contributory    PE left shoulder:    Weaker in the scapular plane with empty can as well as with some external rotation. Causes lateral based pain. Range of motion otherwise well-maintained. No other changes. A/Plan:     ICD-10-CM    1.  Right shoulder pain, unspecified chronicity  M25.511       2. Impingement syndrome of right shoulder  M75.41            Given the chronicity and severity of the patient's symptoms, we will obtain an MRI. We will see them back after the scan and make a determination regarding further treatment. If there is a tear or other problem, they may require surgery. If negative, would recommend NSAID's, PT, or injection. They are amenable to this treatment plan. Return for MRI slot.         Radha Lowery MD  03/08/23

## 2023-03-15 ENCOUNTER — OFFICE VISIT (OUTPATIENT)
Dept: ORTHOPEDIC SURGERY | Age: 73
End: 2023-03-15

## 2023-03-15 DIAGNOSIS — M75.102 TEAR OF LEFT ROTATOR CUFF, UNSPECIFIED TEAR EXTENT, UNSPECIFIED WHETHER TRAUMATIC: ICD-10-CM

## 2023-03-15 DIAGNOSIS — M25.512 LEFT SHOULDER PAIN, UNSPECIFIED CHRONICITY: Primary | ICD-10-CM

## 2023-03-15 NOTE — PROGRESS NOTES
Name: Patrick Shah  YOB: 1950  Gender: male  MRN: [id number]    CC:   Chief Complaint   Patient presents with    Follow-up     MRI results left shoulder        HPI:  Patient presents for MRI FU of left shoulder. Patient presents for recheck of left shoulder pain. Patient has subacromial injection on 1- and we also discussed physical therapy. The patient had a 2-month history of shoulder pain after tripping on some cement which aggravated his shoulder. 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 He states some relief after the injection and with PT. States he feels about 25% better. He still has pain over left shoulder with movement as well as popping and clicking sounds. Denies numbness or tingling. Taking Tylenol PRN. Patient states he is right handed. Allergies   Allergen Reactions    Escitalopram Other (See Comments)     Upset stomach and felt poorly    Valsartan Other (See Comments)     Felt like he was having crazy thoughts and lightheadedness     Past Medical History:   Diagnosis Date    Cardiomyopathy in other disease 1/8/2013    Chest pain, unspecified 8/3/2016    Colon polyp 1/8/2013    Coronary atherosclerosis of native coronary vessel 8/3/2016    Dyslipidemia 8/3/2016    Essential hypertension 1/8/2013    HTN (hypertension) 1/8/2013    HTN (hypertension), benign 8/3/2016    The blood pressure readings have been in the target range. The patient is being seen by a primary care physician. No complications noted from the medication presently being used. Hypercholesterolemia 1/8/2013    Hyperlipidemia 8/3/2016       The patient's hyperlipidemia is stable. Recent laboratory work satisfactory. The patient is being seen by another professional.The patient is using statin drugs and is tolerating this well.      Pre-diabetes 1/8/2013     Past Surgical History:   Procedure Laterality Date    CARDIAC CATHETERIZATION  9/4/2008    [facility]    CATARACT REMOVAL Right 08/16/2021    Drew Angela    CATARACT REMOVAL Left 08/30/2021    Drew Angela    COLONOSCOPY  05/24/2021    Dr. Gipson Lady; colon polyp, diverticulosis, internal hemorrhoid    COLONOSCOPY  7/15/15    Dr. Nehemiah Weems; repeat in 5 years    COLONOSCOPY  April 24, 2009    Dr. Latasha Wiseman; every 5 years    VASCULAR SURGERY  2019    venous LE surgery; Dr. Benjamín Polanco     Family History   Problem Relation Age of Onset    Heart Disease Father 45    Stroke Neg Hx     Heart Surgery Brother 62    Cancer Brother 79        colon cancer    Diabetes Brother     Diabetes Sister     Diabetes Mother      Social History     Socioeconomic History    Marital status:      Spouse name: Not on file    Number of children: Not on file    Years of education: Not on file    Highest education level: Not on file   Occupational History    Not on file   Tobacco Use    Smoking status: Never    Smokeless tobacco: Current     Types: Snuff    Tobacco comments:     Quit smoking: dips   Vaping Use    Vaping Use: Never used   Substance and Sexual Activity    Alcohol use: Not Currently     Alcohol/week: 4.0 standard drinks     Types: 4 Cans of beer per week    Drug use: No    Sexual activity: Not on file   Other Topics Concern    Not on file   Social History Narrative    Not on file     Social Determinants of Health     Financial Resource Strain: Not on file   Food Insecurity: Not on file   Transportation Needs: Not on file   Physical Activity: Inactive    Days of Exercise per Week: 0 days    Minutes of Exercise per Session: 0 min   Stress: Not on file   Social Connections: Not on file   Intimate Partner Violence: Not on file   Housing Stability: Not on file        No flowsheet data found.     Review of Systems  Non-contributory    PE left shoulder:    SHOULDER   Left (Involved) Right   Skin Intact Intact   Radial Pulses 2+ Symmetrical 2+ Symmetrical   Deformity None Normal   Myotomes Normal Normal   Dermatomes  Normal Normal    ROM Has decreased external rotation but flexion and abduction and internal rotation are fairly symmetric. Full   Strength Weak with external rotation. And rotation is normal scapular plane testing is not as painful but is still weak No weakness   Atrophy None noted None noted   Effusion/Swelling  None noted None noted   Palpation Minimally TTP at the shoulder No tenderness   Bicep Tendon Rupture  Negative Negative signs   Bear Hug, Belly Press Negative, negative Negative   Crossed Arm Adduction Test normal     Instability/Ant. Apprehension Test None noted None noted   Impingement Positive mild Neer, villafana, AOM Negative         MRI left shoulder from 3-10-23:  Narrative   EXAM: MRI left shoulder without contrast.   INDICATION: Left shoulder pain   COMPARISON: Left shoulder radiographs, 1/25/2023   TECHNIQUE: Multiplanar multisequence imaging of the left shoulder without   contrast.       FINDINGS:   Acromioclavicular Joint: Severe degenerative changes with osseous and capsular   hypertrophy as well as a joint effusion. Type II acromion. Fluid is present in   the subacromial/subdeltoid bursa. Rotator Cuff: Massive chronic rotator cuff tear with complete tear of the distal   supraspinatus tendon, full-thickness near full width tear of the infraspinatus   tendon with a few far posterior fibers remaining intact, and full thickness   partial width tearing of the superior subscapularis tendon. In total, the tear   measures greater than 5 cm in width with differential tendon retraction to the   level of the glenoid. Grade 2 fatty atrophy of the supraspinatus and   infraspinatus muscles. Biceps Tendon and Labrum: Complete tear and retraction of the long head biceps   tendon. Degenerative tearing along the superior labrum. Articular Cartilage/Bones: High riding humeral head with abutment of the   acromion which demonstrates acetabularization and mild reactive edema.  Marginal   osteophytes along the glenoid. There is mild chondral loss in the glenohumeral   joint with chondral thinning to the humeral head and superior glenoid. A   glenohumeral joint effusion is present. No fracture. Other: No evidence of mass effect in the region of the quadrilateral space or   suprascapular nerve. Impression       1. Massive chronic rotator cuff tear involving the supraspinatus, infraspinatus,   and subscapularis tendons with retraction and grade 2 atrophy. 2. High riding humeral head with mild rotator cuff arthropathy. 3. Complete tear and retraction of the long head biceps tendon. 4. Severe AC joint arthrosis with hypertrophy. Independent review of the MRI from 3/10/2020 through the left shoulder was performed and shows massive chronic rotator cuff tearing of the supraspinatus and infraspinatus with fairly significant atrophy of the infraspinatus and supraspinatus. Teres minor still intact. Subscap still has good muscle. Proximal humeral migration is noted. Torn long head of biceps tendon. I disagree about the subscapularis it appears intact and not retracted. As noted on coronal image 4    A/Plan:     ICD-10-CM    1. Left shoulder pain, unspecified chronicity  M25.512       2. Tear of left rotator cuff, unspecified tear extent, unspecified whether traumatic  M75.102            The patient desires arthroscopy of the left shoulder to include debridement and balloon placement. I talked with them extensively about the risks, benefits, reasonable expectations and expected recovery time as well as possible complications including but not limited to bleeding, infection, neurovascular injury, stiffness, cosmetic abnormality of skin or muscle for biceps related surgery, pain, continued problems, DVT, PE, hardware failure, fracture, heterotopic ossification, MI and other anesthesia related risks, etc.  They seem to understand and wish to proceed.   I gave them education literature and answered their questions. They are of increased risk for surgical intervention secondary to underlying comorbidities including aortic valve stenosis, HTN, history of DVT and age. Return for Surgery.         Krishna Guadarrama MD  03/15/23

## 2023-03-15 NOTE — PATIENT INSTRUCTIONS
The InSpace Subacromial Tissue Spacer System    Patient Education Brochure    While this brochure is meant to provide you with information you need to make an informed  decision about your treatment options, it is not intended to replace professional medical care or  provide medical advice. Glossary  Acromion: The top part of your shoulder blade that sits on top of the upper arm bone (humeral bone) forming the outer edge of your shoulder. Arthritis: General term that describes inflammation of your joints. Arthritis causes joint pain and stiffness. The most common types of arthritis are osteoarthritis (OA) and rheumatoid arthritis (RA). Bioresorbable/Biodegradable Material: A substance used in implants (medical devices) that is absorbed by your body and disappears over time. Gleno-humeral Arthritis: Damage to the cartilage surfaces of your shoulder's \"ball-and-socket\" structure (also known as the gleno-humeral joint). InSpace Implant: An inflatable device made of an absorbable and degradable material (biodegradable polymer) that is inserted into your shoulder (subacromial space) during a minimally invasive surgical procedure (arthroscopic procedure) to treat tears that involve the entire thickness of a muscle or tendon of your shoulder (full thickness massive rotator cuff tears). Partial Rotator Cuff Repair: A surgical procedure repairing the portion of the rotator cuff tendon that requires repair (soft tissue-to-bone fixation) according to your individual needs. Rotator Cuff Tear (RCT): An injury of one or more of the tendons or muscles of the rotator cuff of your shoulder. Symptoms may include shoulder pain, which is often worse with movement, or weakness. Shoulder Joint: One of the largest and most complex joints in your body. Your shoulder joint is formed where your upper arm bone (humerus bone) fits into the shoulder blade, like a ball and socket.   Shoulder Rotator Cuff: Group of muscles and tendons that surround your shoulder joint, keeping the top of your upper arm bone (the ball) firmly within the shoulder socket. Shoulder Arthroscopy: During this type of surgery, 2-3 small cuts are made in your shoulder instead of a single large cut. A tiny camera, called an arthroscope, is used to examine tissues inside or around your shoulder joint and visualize the repair or treatment. Tendon: A tough piece of tissue that connects your muscles to your bones. Why Does Your Shoulder Hurt? To help you understand why your shoulder hurts, lets take a look at how it works. Your shoulder joint has the greatest range of motion of all your joints. As a ball-and-socket joint, it needs help from the surrounding muscles, tendons, and other tissue to stay in place while still allowing your shoulder to move freely. The group of muscles and tendons that stretch from the  shoulder blade to the upper arm bone and keep your shoulder in place are called the rotator cuff. They also help you lift and move your arm. You use your shoulder joint every day during daily living activities. Shoulder pain may be due to overuse, an injury, chronic swelling, inflammation, or arthritis. What Will a Doctor Do? Every doctor is different, but many follow a similar approach to helping you understand your shoulder pain. At your first appointment, your doctor may ask you a few questions about when and where your shoulder hurts to assess whats going on. Pain relief does not always mean surgery. Your doctor may recommend a combination of treatments to help treat your pain and help you get moving again - perhaps a course of physical therapy, rest, ice, or over-the-counter anti-inflammatory medication. The doctor may also ask you to have an additional diagnostic test, like an X-ray or an MRI. These imaging tests are  usually required to confirm whether or not you have a rotator cuff tear (RCT). What is a Rotator Cuff Tear?   When your doctor tells you that you have an RCT, it is because one or more of the four muscles that make up the cuff is injured. RCTs are amongst the most common joint injuries in adults. Tears of your rotator cuff are a common source of pain, weakness and loss of function of your shoulder affecting the ability to perform even the easiest activities of daily living. Rotator cuff tears can be due to the natural worsening of the rotator cuff over time or can be from an injury.    These tears will vary in severity and include the following types:     Partial Thickness RCTs: Tears that are incomplete or do not involve the full thickness of a muscle or tendon of your shoulder.   Full Thickness RCTs: Tears that involve the entire thickness of a muscle or tendon of your shoulder.   Massive RCTs (MRCTs): Described as the rupture of at least two of the four rotator cuff tendons and/or pulling away from the place where the tendons attach in your shoulder of 5 cm (which is approximately 2 inches) or greater.    What are Your Treatment Options?  Depending on the severity of the RCT and/or other conditions, there are many possible treatment options your surgeon can use. If non-surgical treatments and medication do not bring relief and restore shoulder movement, your doctor may recommend shoulder surgery, which may involve a minimally invasive procedure (arthroscopy) or a full shoulder replacement.    What is the InSpace™ Implant?  The InSpace™ Implant is intended for the treatment of patients with MRCTs for patients 65 years of age and older whose clinical conditions would benefit from treatment with a shorter surgical time compared to other surgical treatment options. It is an inflatable device made of a biodegradable polymer material that comes in three sizes (Small, Medium and Large) to fit different shoulder anatomies. Your surgeon will select the most appropriate size for you.    The InSpace™ Implant is inserted into the space under  the outer edge of your shoulder (subacromial space) during a minimally invasive arthroscopic procedure. This helps restore the space between your upper arm bone (humerus bone) and the outer edge of your shoulder (acromion), and also temporarily reduces rubbing between these bones. This helps to relieve pain associated with tendon tears and enables the bones in your shoulder to glide easily next to each Other. Once implanted into your shoulder, the InSpace Implant biodegrades over time and is completely absorbed by your body after approximately one year. Discuss treatment options with your surgeon to determine if treatment with the InSpace  Implant is right for you. Who Cannot Have This Procedure? (Contraindications)  The InSpace Implant should not be used if you have the following conditions:    Known allergy to the InSpace Implant material  Active infection or tissue death in the shoulder area    The InSpace Implant is for adult patients only (66 years of age and older) and should not be used in children. Things Your Surgeon Must Consider Before Using the InSpace Implant (Warnings)  The following Warnings are provided based on data collected in the [address]. Please refer to the safety information below within [facility] was the [address] Study Performed?  for further information on safety of the InSpace Implant. There is evidence of increased risk of total adverse events of the affected shoulder compared to a partial rotator cuff repair, which were reported to be mostly mild to moderate in severity. Please note that complications reported by InSpace and Partial Repair patients were reviewed by a group of experts in shoulder surgery, and it was determined that none were related to the treatment devices (i.e., the InSpace Implant or devices used for Partial Repair).   There is evidence of increased adverse events associated with a serious risk of the index shoulder which includes both medical and surgical treatments. The InSpace Implant is not fixed in place and may change position in the shoulder space. Movement out of the subacromial space and/or deflation of the InSpace Implant was observed in magnetic resonance imaging (MRI) 6 weeks post-operatively in up to half of patients. In these cases, it should be noted that no re-operations were required to remove the InSpace Implant. The risks and benefits of the InSpace Implant in patients with the following conditions should be carefully considered:    Severe arthritis in the shoulder or full thickness cartilage loss  Deltoid muscle defect or deltoid muscle paralysis  Evidence that the shoulder is very unstable  Missing or non-intact ligaments in the shoulder  Known loss of sensation  Major medical conditions such as blood clotting disorders, conditions that suppress the immune system or any other conditions that may compromise healing  Safety and effectiveness of InSpace Implant use in patients below age [de-identified] (66) has not been established  Safety and effectiveness of InSpace Implant use in pregnant or nursing mothers has not been evaluated. Things You Must Do to Avoid Other Harm (Precautions)  It is important to follow your surgeons advice regarding after surgery care (for example, physical therapy) as well as which immediate activities you may or may not perform after receiving the InSpace Implant. Not following this advice may cause complications with the InSpace Implant. Some complications may require you to have additional surgery. Risks of Using the InSpace Implant  If a reaction occurs, it will require follow-up with your surgeon. Some examples are as follows:    Irritation at the site where a cut was made during surgery. Local infection, inflammation, and tissue damage where the InSpace Implant was implanted. Worsening of an existing infection.     Movement of the implant out of its intended location, which may lead to worsening of your shoulder pain, discomfort, and/or stiffness which may require a re-operation, narcotic medications, non-narcotic pain medications, or post-operative shoulder injections. Your surgical procedure may cause complications or adverse events. As with any shoulder surgery involving anesthesia there are potential risks and complications, including dizziness, fainting, difficulty breathing, wound infection, wound drainage, swelling, local pain, bleeding, bruising, surgical wound opening, nerve pain or injury which could result in partial or complete loss of movement in your affected arm, tendon injury, loss of sensation in your skin and muscle around your surgical site, inflammation of the tissues that surround your rotator cuff joint, a frozen rotator cuff (inability to freely move the shoulder joint) and delayed wound healing. There is a risk that a deep vein clot will form (thrombosis) and travel to the lung (pulmonary embolism), which could lead to stroke, heart damage or death. Any of these complications could require treatment such as medicine or additional surgery that may involve replacing or removing your InSpace Implant dependent on timeframe as the implant is biodegradable. See results of study Adverse Events in the safety information below within [facility] was the [address] Study Performed?     Benefits Observed Using the InSpace Implant  The InSpace Implant is designed to restore space between your upper arm bone (humerus bone) and outer edge of your shoulder (acromion), and is designed to improve shoulder motion and function. . It provides a simple and minimally invasive alternative arthroscopic surgical treatment for massive rotator cuff tears that provides long-term safety and effectiveness results similar to an arthroscopic surgical Partial Repair treatment, which is a commonly performed Procedure.     Please note that while many patients experience benefits from treatment with the InSpace Implant, good results after InSpace Implant surgery are not guaranteed. To assess the safety and effectiveness of the InSpace Implant, a clinical study was performed. The results of the InSpace Implant study can be found in the section called [facility] was the  Pivotal Clinical Study Performed?     How to Decide About this Treatment  Discuss the possible risks and benefits associated with rotator cuff treatment options with your surgeon and with your family or caregiver to decide if the InSpace Implant is the right treatment for you. How Should You Prepare for Surgery? Start now. Take care of yourself. Preparing for your operation begins a few weeks before the actual surgery. This is your time to put your health first, so your recovery from surgery can go as smoothly as possible. Check with your surgeon to discuss your specific pre-surgery instructions. What Happens During Your Surgery? The shoulder arthroscopy procedure uses a tiny camera called an arthroscope to examine and prepare the tissue inside and around your shoulder joint. The arthroscope is inserted through a small cut in your skin. There may also be 1 - 2 other small cuts to allow the InSpace Implant to be inserted into the proper position (subacromial space) in your shoulder joint. The arthroscope allows your surgeon to visualize the InSpace Implant throughout your surgery. The InSpace Implant comes in three sizes (Small, Medium and Large) to fit different shoulder    What Can You Expect During Recovery? Everyone recovers at a different pace and faces different issues. Age and overall health always play a role in recovery. After surgery, your shoulder will have a bandage over the small cuts to seal the area. In the days and weeks after surgery you will continue to wear a shoulder sling according to your surgeons guidance. Some patients may have pain for a few weeks after surgery. This pain is expected to decrease over time. In addition, your surgeon may limit the weight and activity placed on your shoulder for the first week or two after surgery. Your surgeon will monitor your healing over the following weeks after surgery and will let you know when you may return to normal daily activities. Be sure to ask your surgeon about any heavy lifting, pushing or pulling as these activities may be restricted. Speak with your surgeon if you have any questions about performing any activities not mentioned. Please see the section entitled, Questions & Answers: InSpace Subacromial Tissue Spacer System, for further details about what to expect after your surgery. sizes. Your surgeon will select the most appropriate size for your specific needs. When to Call Your Surgeon? Call your surgeon if you experience any of the following after surgery:    Signs of infection (fever, chills, redness around your cuts, increased pain). Infection may require you to have a second surgery to remove your InSpace Implant. Bleeding or excessive drainage (continued oozing of liquid) from your surgery site that is more than what your surgeon told you to expect. Too much bleeding or drainage may be a sign that your wound is not healed. You may need to take medicine or may need to have the surgery site re-opened to make sure your healing continues. Severe pain that comes on quickly or an increase in your pain levels that limits your ability to function. This kind of pain may indicate that there is a change with your shoulder joint. Loss of feeling, or significantly decreased sensation (your skin feeling is dull or numb) in your shoulder or arm. Clinical Use of the InSpace Subacromial Tissue Spacer System  The InSpace Subacromial Tissue Spacer System has been available for use throughout [facility] as well as other countries globally.  Since its approval, thousands of InSpace Implants have  been used in patients with a similar shoulder problem as you. How was the [address]  Study Performed? The InSpace Subacromial Tissue Spacer System clinical study was performed to assess the safety and effectiveness of the InSpace Implant against the standard of care, Partial Repair ([place]) for arthroscopic surgical treatment of a full thickness MRCT. The study enrolled 184 patients at up to 20 medical centers, who were randomly assigned to one of two treatment groups: 1) InSpace Implant or 2) Partial Repair. All study participants who signed an informed consent were given questionnaires to assess pain, strength and shoulder function before and after surgery, including both patient and surgeon assessments. Study participants were not aware of which treatment they received until after the study was completed at two (2) years (Month 24). The Safety Information  Device safety was assessed by monitoring complications (adverse events - AEs), as well as any serious complications due to the device (serious adverse device effects - SADEs), through Month 24. All events experienced by study participants were recorded, even if they were not related to the InSpace Implant. Both InSpace and Partial Repair treatments were well tolerated by the study participants. However, AEs of the treated shoulder did occur in both InSpace and Partial Repair treatment groups. Most were reported to be mild to moderate in severity, and the majority of events got better over the course of the study. The most common event reported was shoulder pain, and other types of events reported included stiffness, inflammation, and swelling. There were more AEs of the treated shoulder reported for InSpace subjects, and approximately one-third of InSpace patients and one-quarter of Partial Repair patients reported an event related to their treated shoulder.  Please note that complications reported by InSpace and Partial Repair patients were reviewed by a group of experts in shoulder surgery, and it was determined that none were related to the treatment devices (i.e., the InSpace Implant or devices used for Partial Repair). For AEs that were not related to the treated shoulder, the most common types of events reported included musculoskeletal pain, falls and/or other soft tissue injuries, dizziness, headaches, and pain. There were more total AEs reported in the InSpace group, but similar numbers were observed between the two treatment groups for occurrence of serious complications. The Effectiveness Information  The primary effectiveness of the total data collected from before surgery through Month 24 after treatment was measured by patients meeting the following:    [facility] Rotator Cuff Index [facility]) (designed to evaluate quality of life) total score improvement of at least 275 points from pre-operative baseline at Month 24. [employer] Standardized Shoulder Assessment (ASES) (designed to evaluate function score and pain from patient self-reported responses) score improvement of at least 6.4 points from pre-operative baseline at Month 24. No subsequent secondary surgical interventions (SSSIs) in the index shoulder through Month 24. Absence of Serious Adverse Device Effects (SADEs) through Month 24. The results of the study showed that:    The InSpace Implant was found to be effective for the subgroup of patients 72years of age and older. Both InSpace and Partial Repair groups showed improvements from before surgery over time in the patient questionnaires such as WORC and ASES. For both treatment groups, success rates for patients based on meeting the effectiveness criteria above were found to be 77% and above. On average, InSpace group patients also showed in increase in Range of Motion compared to Partial Repair group patients following surgery.     In addition, the InSpace group had a shorter surgery time on average compared to the Partial Repair group:        Questions & Answers: InSpace Subacromial Tissue Spacer System    Q: How long is the typical length of hospital stay after InSpace surgery? A: Usually, you are not admitted into hospital and your surgery is done as an out-patient procedure with no overnight stay. Q: Are my activities restricted after InSpace surgery? A: Yes. During the days and weeks after surgery you will continue to wear a shoulder sling according to your surgeons guidance. Your surgeon will continue to monitor your progress over the following months and will let you know when you may return to your normal daily activities. Q: When can I return to my normal daily activities? A: At your surgeons recommendation, you can return to your normal daily activities. Be sure to ask your surgeon about any heavy lifting, pushing or pulling as these activities may be restricted. Speak with your surgeon if you have any questions about activities not mentioned. Q: When can I return to work? A: Before returning to work, review your job description and what your employer may do to minimize chances of injury to your shoulder. This may include being able to rest, at times. If you perform physical work, you may return to work once your surgeon approves this activity. Q: How long until I can drive? A: Before returning to driving, make sure that you have the approval of your surgeon. Q: What happens should I ever need an X-ray or MRI, or need to pass through a metal detector? A: There is no metal in your InSpace Implant therefore an X-ray, MRI or metal detectors will not be affected. Q: How long will it take the InSpace Implant to be reabsorbed by my body? A: The InSpace Implant will biodegrade or reabsorb over time in your body, with full resorption expected after approximately one year. Q: Has the implant material used in the InSpace Implant been used in other medical devices? A: Yes. The material used in the InSpace Implant has also been used in many other ways in the body in implanted medical devices for many years. Talk to Your Surgeon  While this brochure is meant to provide you with information you need to make an informed decision about your treatment options, it is not intended to replace professional medical care or provide medical advice. If you have any questions about the InSpace Implant, please call or see your surgeon, who is the only one qualified to diagnose and treat your shoulder condition. As with any surgical procedure, you should select a surgeon who is experienced in performing the specific surgery that you are considering. If you have specific questions about the InSpace Implant or its usefulness in your course of treatment, please contact your surgeon. Important Information  The InSpace Implant    The InSpace Implant is indicated for the treatment of patients with massive, irreparable full-thickness torn rotator cuff tendons due to trauma or degradation with mild to moderate gleno-humeral osteoarthritis in patients greater than or equal to 72years of age whose clinical conditions would benefit from treatment with a shorter surgical time compared to partial rotator cuff repair. The InSpace Implant is not appropriate for patients with certain types of infections, known or suspected allegories to the implant and/or instrument materials, or children. The following information provided is based on data collected in the [address] [place] [address]. There is evidence of increased risk of total adverse events of the affected shoulder compared to a partial rotator cuff repair, which were reported to be mostly mild to moderate in severity.  Please note that complications reported by InSpace and Partial Repair patients were reviewed by a group of experts in shoulder surgery, and it was determined that none were related to the treatment devices (i.e., the InSpace Implant or devices used for Partial Repair). There is evidence of increased adverse events associated with a serious risk of the index shoulder which includes both medical and surgical treatments. The InSpace Implant is not fixed in place and may change position in the shoulder space. Movement out of the subacromial space and/or deflation of the InSpace Implant was observed in magnetic resonance imaging (MRI) 6 weeks post-operatively in up to half of patients. In these cases, it should be noted that no re-operations were required to remove the InSpace Implant. The risks and benefits of the InSpace Implant in patients with the following conditions should be carefully considered: severe arthritis in the shoulder or full thickness cartilage loss, deltoid muscle defect or deltoid muscle paralysis, evidence that the shoulder is very unstable, missing or non-intact ligaments in the shoulder, known loss of sensation, major medical conditions such as blood clotting disorders, conditions that suppress the immune system or any other conditions that may compromise healing, safety and effectiveness of InSpace Implant use in patients below age [de-identified] (66) has not been established, and safety and effectiveness of InSpace Implant use in pregnant or nursing mothers has not been evaluated. If a reaction occurs, it will require follow-up with your surgeon. Some examples are as follows: irritation at the site where a cut was made during surgery, local infection, inflammation, and tissue damage where the InSpace Implant was implanted, or worsening of an existing infection. Additionally, movement of the implant out of its intended location may lead to worsening of your shoulder pain, discomfort, and/or stiffness which may require a re-operation, narcotic medications, non-narcotic pain medications, or post-operative shoulder injections. As with any surgery, your surgical procedure may cause complications or adverse events. As with any shoulder surgery involving anesthesia there are potential risks and complications, including dizziness, fainting, difficulty breathing, wound infection, wound drainage, swelling, local pain, bleeding, bruising, surgical wound opening, nerve pain or injury which could result in partial or complete loss of movement in your affected arm, tendon injury, loss of sensation in your skin and muscle around your surgical site, inflammation of the tissues that surround your rotator cuff joint, a frozen rotator cuff (inability to freely move the shoulder joint) and delayed wound healing. There is a risk that a deep vein clot will form (thrombosis) and travel to the lung (pulmonary embolism), which could lead to stroke, heart damage or death. Any of the aforementioned complications/risks could require treatment such as medicine or additional surgery that may involve replacing or removing your InSpace Implant dependent on timeframe as the implant is biodegradable. The information presented is for educational purposes only. Speak to your doctor to decide if you are interested in learning more about the InSpace Implant. Individual results vary and not all patients will receive the same postoperative activity level. It is important to follow your surgeons advice regarding after surgery care (for example, physical therapy) as well as which immediate activities you may or may not perform after receiving the InSpace Implant. Not following this advice may cause complications with the InSpace Implant. Some complications may require you to have additional surgery. Ask your doctor if the InSpace Implant is right for you. [employer] or its other divisions or other corporate affiliated entities own, use or have applied for the following trademarks or service marks: InSpace, [employer], Together with our customers, we are driven to make healthcare better.  All other trademarks are trademarks of their respective owners or holders. [id number] Rev. A  Copyright © 2021 [employer]  Printed in [address],  [address] [facility] [address] [facility], [address] [facility]  t:  f:   [employer]. [email or web address]

## 2023-03-15 NOTE — PROGRESS NOTES
Patient was fitted and instructed on the use of a size L Ultrasling for the patient's left shoulder. I provided the following instructions along with proper fitting as noted below. Fitting instructions included   How to adjusting the thumb support and avoiding irritation to hand. Patient was instructed this should not be used until the block given at surgery has worn off and patient has regained feeling in hand. How to manage the quick release connection. The shoulder straps are adjusted to insure correct fit including trimming any excess material.   The shoulder strap crosses over the opposite shoulder being sure to avoid excess pull on neck  Placement of pillow placed at the waist line of the affected shoulder with the Velcro facing away from body allowing for sling attachment. Positioning the elbow in sling as far back as possible providing adequate support  Keeping the forearm close to the body preventing excessive ER   Keeping the hand higher than the elbow to allow for adequate support of arm in sling and avoiding excess swelling to hand. How to detach the shoulder strap chilo and open front panel to allow for proper hygiene. Patient was informed waist belt should stay in place at all times unless told otherwise by the Doctor or Physical Therapist.   Patient was also made aware to bring an oversized shirt to surgery to provide coverage and comfort when leaving the hospital.   The patient was instructed to bring ultrasling, oversized shirt, and iceman pad (if purchased or prescribed) with them on the day of surgery. Patient was fitted and instructed on the use of an Ultrasling for the patients left shoulder. I fitted patient with a size L ultrasling. Patient was instructed to position elbow in sling as far back as possible and that the arm should be internally rotated lying nicely against abdomen.  I demonstrated how the shoulder strap crosses over the opposite shoulder and connects to the quick release chilo on the sling. The thumb and sling strap were then placed correctly on the sling. The shoulder straps were adjusted to insure correct fit which can involve trimming excess material. Once the sling is correctly fitted, the pillow is then placed at the waist line of the affected shoulder with the Velcro facing away from body. The sling is attached to the outside of the pillow, along the hook and loop strips. I then buckled the waist strap to the pillow and adjusted the strap. During the fitting process I explained how to detach the shoulder strap chilo and open front panel to allow for proper hygiene. Patient was informed waist belt should stay in place at all times unless told otherwise by the Doctor or Physical Therapist Patient read and signed documenting they understand and agree to [facility]'s current DME return policy.

## 2023-03-16 ENCOUNTER — TELEPHONE (OUTPATIENT)
Dept: CARDIOLOGY CLINIC | Age: 73
End: 2023-03-16

## 2023-03-16 DIAGNOSIS — I25.10 ATHEROSCLEROSIS OF NATIVE CORONARY ARTERY OF NATIVE HEART WITHOUT ANGINA PECTORIS: Primary | ICD-10-CM

## 2023-03-17 NOTE — TELEPHONE ENCOUNTER
Per Dr. Mary Hicks, [place] labs I see that he may need are lipids. Usually done by PCP. As long as lipids are done within 3-6 months of next appointment\"     Spoke with patients wife, informed her of Dr. Fiordaliza Lynn response. Lipid panel ordered.

## 2023-03-29 LAB
CHOLEST SERPL-MCNC: 161 MG/DL (ref 100–199)
HDLC SERPL-MCNC: 77 MG/DL
LDLC SERPL CALC-MCNC: 74 MG/DL (ref 0–99)
SPECIMEN STATUS REPORT: NORMAL
TRIGL SERPL-MCNC: 47 MG/DL (ref 0–149)
VLDLC SERPL CALC-MCNC: 10 MG/DL (ref 5–40)

## 2023-04-27 ENCOUNTER — TELEPHONE (OUTPATIENT)
Dept: CARDIOLOGY CLINIC | Age: 73
End: 2023-04-27

## 2023-05-08 ENCOUNTER — OFFICE VISIT (OUTPATIENT)
Age: 73
End: 2023-05-08
Payer: MEDICARE

## 2023-05-08 VITALS
WEIGHT: 210 LBS | HEIGHT: 69 IN | DIASTOLIC BLOOD PRESSURE: 72 MMHG | BODY MASS INDEX: 31.1 KG/M2 | HEART RATE: 54 BPM | SYSTOLIC BLOOD PRESSURE: 140 MMHG

## 2023-05-08 DIAGNOSIS — I10 ESSENTIAL HYPERTENSION: ICD-10-CM

## 2023-05-08 DIAGNOSIS — I25.10 ATHEROSCLEROSIS OF NATIVE CORONARY ARTERY OF NATIVE HEART WITHOUT ANGINA PECTORIS: Primary | ICD-10-CM

## 2023-05-08 DIAGNOSIS — I35.0 NONRHEUMATIC AORTIC VALVE STENOSIS: ICD-10-CM

## 2023-05-08 DIAGNOSIS — Z01.810 PREOP CARDIOVASCULAR EXAM: ICD-10-CM

## 2023-05-08 PROCEDURE — 3078F DIAST BP <80 MM HG: CPT | Performed by: INTERNAL MEDICINE

## 2023-05-08 PROCEDURE — 3017F COLORECTAL CA SCREEN DOC REV: CPT | Performed by: INTERNAL MEDICINE

## 2023-05-08 PROCEDURE — 3077F SYST BP >= 140 MM HG: CPT | Performed by: INTERNAL MEDICINE

## 2023-05-08 PROCEDURE — G8417 CALC BMI ABV UP PARAM F/U: HCPCS | Performed by: INTERNAL MEDICINE

## 2023-05-08 PROCEDURE — 1123F ACP DISCUSS/DSCN MKR DOCD: CPT | Performed by: INTERNAL MEDICINE

## 2023-05-08 PROCEDURE — 93000 ELECTROCARDIOGRAM COMPLETE: CPT | Performed by: INTERNAL MEDICINE

## 2023-05-08 PROCEDURE — 4004F PT TOBACCO SCREEN RCVD TLK: CPT | Performed by: INTERNAL MEDICINE

## 2023-05-08 PROCEDURE — 99214 OFFICE O/P EST MOD 30 MIN: CPT | Performed by: INTERNAL MEDICINE

## 2023-05-08 PROCEDURE — G8427 DOCREV CUR MEDS BY ELIG CLIN: HCPCS | Performed by: INTERNAL MEDICINE

## 2023-05-08 ASSESSMENT — ENCOUNTER SYMPTOMS
HEMATOCHEZIA: 0
BLURRED VISION: 0
ORTHOPNEA: 0
WHEEZING: 0
SHORTNESS OF BREATH: 0
ABDOMINAL PAIN: 0
COLOR CHANGE: 0
DIARRHEA: 0
SPUTUM PRODUCTION: 0
HEMATEMESIS: 0
BOWEL INCONTINENCE: 0
HOARSE VOICE: 0

## 2023-05-08 NOTE — PROGRESS NOTES
[address], [address]  PHONE: [phone]        23        NAME:  Elsa Vences  : 1950  MRN: [id number]       SUBJECTIVE:   Elsa Vences is a 72 y.o. male seen for a follow up visit regarding the following: The patient is a former patient of Dr Marquez Reed. He has a hx of mild AS,CAD,and primary hypertension. He returns for cardiac evaluation before arthroscopic left shoulder surgery. He reports doing well. He denies chest pain or excessive WALKER. Chief Complaint   Patient presents with    Coronary Artery Disease    New Patient     Previously seen by Dr. Marquez Reed    Cardiac Clearance       HPI:    Hypertension  This is a chronic problem. The problem is unchanged. Pertinent negatives include no anxiety, blurred vision, chest pain, headaches, malaise/fatigue, neck pain, orthopnea, palpitations, peripheral edema, PND, shortness of breath or sweats. Past Medical History, Past Surgical History, Family history, Social History, and Medications were all reviewed with the patient today and updated as necessary.          Current Outpatient Medications:     diclofenac sodium (VOLTAREN) 1 % GEL, Apply topically 2 times daily, Disp: , Rfl:     chlorthalidone (HYGROTON) 25 MG tablet, Take 1 tablet by mouth daily, Disp: 90 tablet, Rfl: 3    Azilsartan Medoxomil (EDARBI) 80 MG TABS, Take 1 tablet by mouth daily, Disp: 90 tablet, Rfl: 3    Omega-3 Fatty Acids (FISH OIL) 1000 MG CAPS, Take 3 capsules by mouth 4 times daily, Disp: , Rfl:     atorvastatin (LIPITOR) 40 MG tablet, Take 1 tablet by mouth daily, Disp: 90 tablet, Rfl: 3    escitalopram (LEXAPRO) 10 MG tablet, Take 1 tablet by mouth daily, Disp: 90 tablet, Rfl: 3    nebivolol (BYSTOLIC) 20 MG TABS tablet, Take 1 tablet by mouth daily 1 p.o. each night for blood pressure, Disp: 90 tablet, Rfl: 3    aspirin 81 MG chewable tablet, Take 1 tablet by mouth daily, Disp: , Rfl:     cyanocobalamin

## 2023-05-21 ENCOUNTER — APPOINTMENT (OUTPATIENT)
Dept: CT IMAGING | Age: 73
End: 2023-05-21
Payer: MEDICARE

## 2023-05-21 ENCOUNTER — HOSPITAL ENCOUNTER (EMERGENCY)
Age: 73
Discharge: HOME | End: 2023-05-22
Attending: EMERGENCY MEDICINE
Payer: MEDICARE

## 2023-05-21 VITALS
RESPIRATION RATE: 19 BRPM | HEART RATE: 56 BPM | TEMPERATURE: 98.4 F | OXYGEN SATURATION: 99 % | SYSTOLIC BLOOD PRESSURE: 142 MMHG | DIASTOLIC BLOOD PRESSURE: 78 MMHG

## 2023-05-21 DIAGNOSIS — E87.1 HYPONATREMIA: ICD-10-CM

## 2023-05-21 DIAGNOSIS — S01.01XA LACERATION OF SCALP, INITIAL ENCOUNTER: ICD-10-CM

## 2023-05-21 DIAGNOSIS — S09.90XA INJURY OF HEAD, INITIAL ENCOUNTER: Primary | ICD-10-CM

## 2023-05-21 LAB
ANION GAP SERPL CALC-SCNC: 8 MMOL/L (ref 2–11)
BASOPHILS # BLD: 0 K/UL (ref 0–0.2)
BASOPHILS NFR BLD: 1 % (ref 0–2)
BUN SERPL-MCNC: 15 MG/DL (ref 8–23)
CALCIUM SERPL-MCNC: 8.3 MG/DL (ref 8.3–10.4)
CHLORIDE SERPL-SCNC: 92 MMOL/L (ref 101–110)
CO2 SERPL-SCNC: 26 MMOL/L (ref 21–32)
CREAT SERPL-MCNC: 1 MG/DL (ref 0.8–1.5)
DIFFERENTIAL METHOD BLD: ABNORMAL
EOSINOPHIL # BLD: 0.2 K/UL (ref 0–0.8)
EOSINOPHIL NFR BLD: 3 % (ref 0.5–7.8)
ERYTHROCYTE [DISTWIDTH] IN BLOOD BY AUTOMATED COUNT: 11.6 % (ref 11.9–14.6)
GLUCOSE SERPL-MCNC: 97 MG/DL (ref 65–100)
HCT VFR BLD AUTO: 32.4 % (ref 41.1–50.3)
HGB BLD-MCNC: 11.5 G/DL (ref 13.6–17.2)
IMM GRANULOCYTES # BLD AUTO: 0 K/UL (ref 0–0.5)
IMM GRANULOCYTES NFR BLD AUTO: 0 % (ref 0–5)
LYMPHOCYTES # BLD: 1.7 K/UL (ref 0.5–4.6)
LYMPHOCYTES NFR BLD: 25 % (ref 13–44)
MCH RBC QN AUTO: 33.5 PG (ref 26.1–32.9)
MCHC RBC AUTO-ENTMCNC: 35.5 G/DL (ref 31.4–35)
MCV RBC AUTO: 94.5 FL (ref 82–102)
MONOCYTES # BLD: 0.7 K/UL (ref 0.1–1.3)
MONOCYTES NFR BLD: 10 % (ref 4–12)
NEUTS SEG # BLD: 4.2 K/UL (ref 1.7–8.2)
NEUTS SEG NFR BLD: 61 % (ref 43–78)
NRBC # BLD: 0 K/UL (ref 0–0.2)
PLATELET # BLD AUTO: 192 K/UL (ref 150–450)
PMV BLD AUTO: 9.3 FL (ref 9.4–12.3)
POTASSIUM SERPL-SCNC: 3.8 MMOL/L (ref 3.5–5.1)
RBC # BLD AUTO: 3.43 M/UL (ref 4.23–5.6)
SODIUM SERPL-SCNC: 126 MMOL/L (ref 133–143)
WBC # BLD AUTO: 6.8 K/UL (ref 4.3–11.1)

## 2023-05-21 PROCEDURE — 85025 COMPLETE CBC W/AUTO DIFF WBC: CPT

## 2023-05-21 PROCEDURE — 99284 EMERGENCY DEPT VISIT MOD MDM: CPT

## 2023-05-21 PROCEDURE — 80048 BASIC METABOLIC PNL TOTAL CA: CPT

## 2023-05-21 PROCEDURE — 70450 CT HEAD/BRAIN W/O DYE: CPT

## 2023-05-21 PROCEDURE — 12031 INTMD RPR S/A/T/EXT 2.5 CM/<: CPT

## 2023-05-21 PROCEDURE — 90471 IMMUNIZATION ADMIN: CPT | Performed by: EMERGENCY MEDICINE

## 2023-05-21 PROCEDURE — 2500000003 HC RX 250 WO HCPCS: Performed by: PHYSICIAN ASSISTANT

## 2023-05-21 PROCEDURE — 90714 TD VACC NO PRESV 7 YRS+ IM: CPT | Performed by: EMERGENCY MEDICINE

## 2023-05-21 PROCEDURE — 6360000002 HC RX W HCPCS: Performed by: EMERGENCY MEDICINE

## 2023-05-21 PROCEDURE — 72125 CT NECK SPINE W/O DYE: CPT

## 2023-05-21 RX ORDER — TETANUS AND DIPHTHERIA TOXOIDS ADSORBED 2; 2 [LF]/.5ML; [LF]/.5ML
0.5 INJECTION INTRAMUSCULAR
Status: COMPLETED | OUTPATIENT
Start: 2023-05-21 | End: 2023-05-21

## 2023-05-21 RX ORDER — LIDOCAINE HYDROCHLORIDE 10 MG/ML
INJECTION, SOLUTION INFILTRATION; PERINEURAL
Status: DISCONTINUED
Start: 2023-05-21 | End: 2023-05-21

## 2023-05-21 RX ORDER — LIDOCAINE HYDROCHLORIDE AND EPINEPHRINE 10; 10 MG/ML; UG/ML
20 INJECTION, SOLUTION INFILTRATION; PERINEURAL
Status: COMPLETED | OUTPATIENT
Start: 2023-05-21 | End: 2023-05-21

## 2023-05-21 RX ADMIN — TETANUS AND DIPHTHERIA TOXOIDS ADSORBED 0.5 ML: 2; 2 INJECTION INTRAMUSCULAR at 21:04

## 2023-05-21 RX ADMIN — LIDOCAINE HYDROCHLORIDE,EPINEPHRINE BITARTRATE 20 ML: 10; .01 INJECTION, SOLUTION INFILTRATION; PERINEURAL at 23:54

## 2023-05-21 ASSESSMENT — LIFESTYLE VARIABLES
HOW MANY STANDARD DRINKS CONTAINING ALCOHOL DO YOU HAVE ON A TYPICAL DAY: 3 OR 4
HOW OFTEN DO YOU HAVE A DRINK CONTAINING ALCOHOL: 2-4 TIMES A MONTH

## 2023-05-22 NOTE — ED NOTES
Add Celebrex and switch to Norco 10 from oxycodone 10 Patient ambulated in hallway at this time with a steady gait. Patient denies dizziness and denies feeling unsteady.       Jose Sadler RN  05/21/23 2241

## 2023-05-22 NOTE — ED NOTES
I have reviewed discharge instructions with the patient and spouse. The patient and spouse verbalized understanding. Patient left ED via Discharge Method: ambulatory to Home with spouse. Opportunity for questions and clarification provided. Patient given 0 scripts. To continue your aftercare when you leave the hospital, you may receive an automated call from our care team to check in on how you are doing. This is a free service and part of our promise to provide the best care and service to meet your aftercare needs.  If you have questions, or wish to unsubscribe from this service please call [phone]. Thank you for Choosing our [facility] Emergency Department.        Karmen Elaine RN  05/22/23 0004

## 2023-05-22 NOTE — ED PROVIDER NOTES
Chronic opacification of the right maxillary sinus. Mastoid air   cells and middle ear cavities are clear bilaterally. No acute findings in the   orbits. Cervical spine CT findings:    No cervical spine fracture. No focal osseous lesions. Mild levoscoliosis. 10% anterolisthesis of C3 on C4. 15% retrolisthesis of C4 on   C5 and C5 on C6. Trace anterolisthesis of C7 on T1. Moderate annular   disc-osteophyte bulges at C4-C5 and C5-C6. Diffuse bilateral facet arthrosis   greater on the right side. Bilateral carotid atherosclerosis. Impression    1. Occipital scalp hematoma. 2. No fracture the calvarium or skull base. 3. No intracranial hemorrhage. 4. No cervical spine fracture. 5. Degenerative changes as above. This examination was interpreted by ROMÁN Romero M.D.   5/21/2023 8:21:00 PM   CT CSpine W/O Contrast    Narrative    EXAMINATION: CT HEAD WO CONTRAST, CT CERVICAL SPINE WO CONTRAST    DATE: 5/21/2023 7:45 PM     INDICATION: Closed head injury, head and neck pain     COMPARISON: 1/14/2023     TECHNIQUE: Noncontrast CT of the head with coronal reformats. Noncontrast   cervical spine CT with coronal and sagittal reformats. CT dose lowering   techniques were used, to include: automated exposure control, adjustment for   patient size, and or use of iterative reconstruction. Head CT findings:     Intracranial contents:    No acute intracranial hemorrhage, evidence of acute territorial infarct, mass,   mass effect or hydrocephalus. Cerebral volume is normal for age. Gray-white   differentiation is preserved. Bones and extracranial soft tissues:     Occipital scalp hematoma. No fracture or focal osseous lesion in the calvarium   or skull base. Chronic opacification of the right maxillary sinus. Mastoid air   cells and middle ear cavities are clear bilaterally. No acute findings in the   orbits.       Cervical spine CT findings:    No cervical spine

## 2023-05-22 NOTE — DISCHARGE INSTRUCTIONS
Staples need to be removed in 10 days. This can be done at your primary care or an urgent care. Return here with worsening or worrisome symptoms.

## 2023-06-07 PROBLEM — Z01.810 PREOP CARDIOVASCULAR EXAM: Status: RESOLVED | Noted: 2023-05-08 | Resolved: 2023-06-07

## 2023-06-16 ENCOUNTER — HOSPITAL ENCOUNTER (INPATIENT)
Age: 73
LOS: 3 days | Discharge: HOME | DRG: 242 | End: 2023-06-20
Attending: EMERGENCY MEDICINE | Admitting: INTERNAL MEDICINE
Payer: MEDICARE

## 2023-06-16 ENCOUNTER — APPOINTMENT (OUTPATIENT)
Dept: NON INVASIVE DIAGNOSTICS | Age: 73
DRG: 242 | End: 2023-06-16
Payer: MEDICARE

## 2023-06-16 ENCOUNTER — APPOINTMENT (OUTPATIENT)
Dept: GENERAL RADIOLOGY | Age: 73
DRG: 242 | End: 2023-06-16
Payer: MEDICARE

## 2023-06-16 DIAGNOSIS — R60.9 DEPENDENT EDEMA: ICD-10-CM

## 2023-06-16 DIAGNOSIS — F41.8 SITUATIONAL ANXIETY: ICD-10-CM

## 2023-06-16 DIAGNOSIS — I49.9 CARDIAC ARRHYTHMIA, UNSPECIFIED CARDIAC ARRHYTHMIA TYPE: ICD-10-CM

## 2023-06-16 DIAGNOSIS — R00.1 BRADYCARDIA: ICD-10-CM

## 2023-06-16 DIAGNOSIS — F43.9 STRESS: ICD-10-CM

## 2023-06-16 DIAGNOSIS — I35.8 NONRHEUMATIC AORTIC VALVE SCLEROSIS: Primary | ICD-10-CM

## 2023-06-16 PROBLEM — E66.811 CLASS 1 OBESITY DUE TO EXCESS CALORIES WITH SERIOUS COMORBIDITY AND BODY MASS INDEX (BMI) OF 32.0 TO 32.9 IN ADULT: Status: ACTIVE | Noted: 2019-12-19

## 2023-06-16 PROBLEM — E66.09 CLASS 1 OBESITY DUE TO EXCESS CALORIES WITH SERIOUS COMORBIDITY AND BODY MASS INDEX (BMI) OF 32.0 TO 32.9 IN ADULT: Status: ACTIVE | Noted: 2019-12-19

## 2023-06-16 PROBLEM — E87.70 VOLUME OVERLOAD: Status: ACTIVE | Noted: 2023-06-16

## 2023-06-16 PROBLEM — I35.0 NONRHEUMATIC AORTIC VALVE STENOSIS: Status: ACTIVE | Noted: 2021-09-15

## 2023-06-16 PROBLEM — I48.91 ATRIAL FIBRILLATION (HCC): Status: ACTIVE | Noted: 2023-06-16

## 2023-06-16 LAB
ALBUMIN SERPL-MCNC: 4 G/DL (ref 3.2–4.6)
ALBUMIN/GLOB SERPL: 1.3 (ref 0.4–1.6)
ALP SERPL-CCNC: 72 U/L (ref 50–136)
ALT SERPL-CCNC: 74 U/L (ref 12–65)
ANION GAP SERPL CALC-SCNC: 7 MMOL/L (ref 2–11)
APPEARANCE UR: CLEAR
AST SERPL-CCNC: 45 U/L (ref 15–37)
BASOPHILS # BLD: 0 K/UL (ref 0–0.2)
BASOPHILS NFR BLD: 1 % (ref 0–2)
BILIRUB SERPL-MCNC: 0.9 MG/DL (ref 0.2–1.1)
BILIRUB UR QL: NEGATIVE
BUN SERPL-MCNC: 20 MG/DL (ref 8–23)
CALCIUM SERPL-MCNC: 9.1 MG/DL (ref 8.3–10.4)
CHLORIDE SERPL-SCNC: 104 MMOL/L (ref 101–110)
CO2 SERPL-SCNC: 25 MMOL/L (ref 21–32)
COLOR UR: ABNORMAL
CREAT SERPL-MCNC: 1.3 MG/DL (ref 0.8–1.5)
DIFFERENTIAL METHOD BLD: ABNORMAL
ECHO AO ASC DIAM: 3.7 CM
ECHO AO ASCENDING AORTA INDEX: 1.73 CM/M2
ECHO AO ROOT DIAM: 3.2 CM
ECHO AO ROOT INDEX: 1.5 CM/M2
ECHO AV AREA PEAK VELOCITY: 1.6 CM2
ECHO AV AREA VTI: 1.6 CM2
ECHO AV AREA/BSA PEAK VELOCITY: 0.7 CM2/M2
ECHO AV AREA/BSA VTI: 0.7 CM2/M2
ECHO AV MEAN GRADIENT: 7 MMHG
ECHO AV MEAN VELOCITY: 1.3 M/S
ECHO AV PEAK GRADIENT: 17 MMHG
ECHO AV PEAK VELOCITY: 2.1 M/S
ECHO AV VELOCITY RATIO: 0.48
ECHO AV VTI: 49.3 CM
ECHO BSA: 2.21 M2
ECHO EST RA PRESSURE: 8 MMHG
ECHO IVC PROX: 2.6 CM
ECHO LA AREA 2C: 27 CM2
ECHO LA AREA 4C: 26.8 CM2
ECHO LA DIAMETER INDEX: 2.1 CM/M2
ECHO LA DIAMETER: 4.5 CM
ECHO LA MAJOR AXIS: 7.2 CM
ECHO LA MINOR AXIS: 7.2 CM
ECHO LA TO AORTIC ROOT RATIO: 1.41
ECHO LA VOL 2C: 83 ML (ref 18–58)
ECHO LA VOL 4C: 78 ML (ref 18–58)
ECHO LA VOL BP: 80 ML (ref 18–58)
ECHO LA VOL/BSA BIPLANE: 37 ML/M2 (ref 16–34)
ECHO LA VOLUME INDEX A2C: 39 ML/M2 (ref 16–34)
ECHO LA VOLUME INDEX A4C: 36 ML/M2 (ref 16–34)
ECHO LV E' LATERAL VELOCITY: 10 CM/S
ECHO LV E' SEPTAL VELOCITY: 7 CM/S
ECHO LV EDV A2C: 159 ML
ECHO LV EDV A4C: 173 ML
ECHO LV EDV INDEX A4C: 81 ML/M2
ECHO LV EDV NDEX A2C: 74 ML/M2
ECHO LV EJECTION FRACTION A2C: 59 %
ECHO LV EJECTION FRACTION A4C: 58 %
ECHO LV EJECTION FRACTION BIPLANE: 58 % (ref 55–100)
ECHO LV ESV A2C: 65 ML
ECHO LV ESV A4C: 72 ML
ECHO LV ESV INDEX A2C: 30 ML/M2
ECHO LV ESV INDEX A4C: 34 ML/M2
ECHO LV FRACTIONAL SHORTENING: 30 % (ref 28–44)
ECHO LV INTERNAL DIMENSION DIASTOLE INDEX: 2.66 CM/M2
ECHO LV INTERNAL DIMENSION DIASTOLIC: 5.7 CM (ref 4.2–5.9)
ECHO LV INTERNAL DIMENSION SYSTOLIC INDEX: 1.87 CM/M2
ECHO LV INTERNAL DIMENSION SYSTOLIC: 4 CM
ECHO LV IVSD: 1.1 CM (ref 0.6–1)
ECHO LV MASS 2D: 256.7 G (ref 88–224)
ECHO LV MASS INDEX 2D: 120 G/M2 (ref 49–115)
ECHO LV POSTERIOR WALL DIASTOLIC: 1.1 CM (ref 0.6–1)
ECHO LV RELATIVE WALL THICKNESS RATIO: 0.39
ECHO LVOT AREA: 3.1 CM2
ECHO LVOT AV VTI INDEX: 0.51
ECHO LVOT DIAM: 2 CM
ECHO LVOT MEAN GRADIENT: 2 MMHG
ECHO LVOT PEAK GRADIENT: 4 MMHG
ECHO LVOT PEAK VELOCITY: 1 M/S
ECHO LVOT STROKE VOLUME INDEX: 37.1 ML/M2
ECHO LVOT SV: 79.4 ML
ECHO LVOT VTI: 25.3 CM
ECHO MV E DECELERATION TIME (DT): 191 MS
ECHO MV E VELOCITY: 1.1 M/S
ECHO MV E/E' LATERAL: 11
ECHO MV E/E' RATIO (AVERAGED): 13.36
ECHO MV E/E' SEPTAL: 15.71
ECHO PV ACCELERATION TIME (AT): 95 MS
ECHO PV MAX VELOCITY: 1.1 M/S
ECHO PV PEAK GRADIENT: 5 MMHG
ECHO RIGHT VENTRICULAR SYSTOLIC PRESSURE (RVSP): 61 MMHG
ECHO RV BASAL DIMENSION: 4.3 CM
ECHO RV FREE WALL PEAK S': 7 CM/S
ECHO RV TAPSE: 2.1 CM (ref 1.7–?)
ECHO TV REGURGITANT MAX VELOCITY: 3.63 M/S
ECHO TV REGURGITANT PEAK GRADIENT: 53 MMHG
EKG DIAGNOSIS: NORMAL
EKG Q-T INTERVAL: 484 MS
EKG QRS DURATION: 88 MS
EKG QTC CALCULATION (BAZETT): 423 MS
EKG R AXIS: 38 DEGREES
EKG T AXIS: 21 DEGREES
EKG VENTRICULAR RATE: 46 BPM
EOSINOPHIL # BLD: 0.1 K/UL (ref 0–0.8)
EOSINOPHIL NFR BLD: 1 % (ref 0.5–7.8)
ERYTHROCYTE [DISTWIDTH] IN BLOOD BY AUTOMATED COUNT: 12.6 % (ref 11.9–14.6)
GLOBULIN SER CALC-MCNC: 3 G/DL (ref 2.8–4.5)
GLUCOSE SERPL-MCNC: 105 MG/DL (ref 65–100)
GLUCOSE UR STRIP.AUTO-MCNC: NEGATIVE MG/DL
HCT VFR BLD AUTO: 30.9 % (ref 41.1–50.3)
HGB BLD-MCNC: 10.6 G/DL (ref 13.6–17.2)
HGB UR QL STRIP: NEGATIVE
IMM GRANULOCYTES # BLD AUTO: 0 K/UL (ref 0–0.5)
IMM GRANULOCYTES NFR BLD AUTO: 0 % (ref 0–5)
KETONES UR QL STRIP.AUTO: ABNORMAL MG/DL
LEUKOCYTE ESTERASE UR QL STRIP.AUTO: NEGATIVE
LYMPHOCYTES # BLD: 0.8 K/UL (ref 0.5–4.6)
LYMPHOCYTES NFR BLD: 18 % (ref 13–44)
MAGNESIUM SERPL-MCNC: 2.2 MG/DL (ref 1.8–2.4)
MCH RBC QN AUTO: 33.2 PG (ref 26.1–32.9)
MCHC RBC AUTO-ENTMCNC: 34.3 G/DL (ref 31.4–35)
MCV RBC AUTO: 96.9 FL (ref 82–102)
MONOCYTES # BLD: 0.5 K/UL (ref 0.1–1.3)
MONOCYTES NFR BLD: 10 % (ref 4–12)
NEUTS SEG # BLD: 3.2 K/UL (ref 1.7–8.2)
NEUTS SEG NFR BLD: 70 % (ref 43–78)
NITRITE UR QL STRIP.AUTO: NEGATIVE
NRBC # BLD: 0 K/UL (ref 0–0.2)
NT PRO BNP: 5373 PG/ML (ref 5–125)
NT PRO BNP: 7062 PG/ML (ref 5–125)
PH UR STRIP: 6.5 (ref 5–9)
PLATELET # BLD AUTO: 200 K/UL (ref 150–450)
PMV BLD AUTO: 9.8 FL (ref 9.4–12.3)
POTASSIUM SERPL-SCNC: 4.2 MMOL/L (ref 3.5–5.1)
PROT SERPL-MCNC: 7 G/DL (ref 6.3–8.2)
PROT UR STRIP-MCNC: NEGATIVE MG/DL
RBC # BLD AUTO: 3.19 M/UL (ref 4.23–5.6)
SODIUM SERPL-SCNC: 136 MMOL/L (ref 133–143)
SP GR UR REFRACTOMETRY: 1.01 (ref 1–1.02)
TROPONIN I SERPL HS-MCNC: 24 PG/ML (ref 0–57)
UROBILINOGEN UR QL STRIP.AUTO: 1 EU/DL (ref 0.2–1)
WBC # BLD AUTO: 4.6 K/UL (ref 4.3–11.1)

## 2023-06-16 PROCEDURE — 85025 COMPLETE CBC W/AUTO DIFF WBC: CPT

## 2023-06-16 PROCEDURE — 6370000000 HC RX 637 (ALT 250 FOR IP): Performed by: INTERNAL MEDICINE

## 2023-06-16 PROCEDURE — 99223 1ST HOSP IP/OBS HIGH 75: CPT | Performed by: INTERNAL MEDICINE

## 2023-06-16 PROCEDURE — 84484 ASSAY OF TROPONIN QUANT: CPT

## 2023-06-16 PROCEDURE — 6360000002 HC RX W HCPCS: Performed by: PHYSICIAN ASSISTANT

## 2023-06-16 PROCEDURE — G0378 HOSPITAL OBSERVATION PER HR: HCPCS

## 2023-06-16 PROCEDURE — 71046 X-RAY EXAM CHEST 2 VIEWS: CPT

## 2023-06-16 PROCEDURE — 2580000003 HC RX 258: Performed by: PHYSICIAN ASSISTANT

## 2023-06-16 PROCEDURE — 96374 THER/PROPH/DIAG INJ IV PUSH: CPT

## 2023-06-16 PROCEDURE — 93005 ELECTROCARDIOGRAM TRACING: CPT | Performed by: EMERGENCY MEDICINE

## 2023-06-16 PROCEDURE — 6370000000 HC RX 637 (ALT 250 FOR IP): Performed by: PHYSICIAN ASSISTANT

## 2023-06-16 PROCEDURE — 96376 TX/PRO/DX INJ SAME DRUG ADON: CPT

## 2023-06-16 PROCEDURE — 2580000003 HC RX 258: Performed by: INTERNAL MEDICINE

## 2023-06-16 PROCEDURE — 80053 COMPREHEN METABOLIC PANEL: CPT

## 2023-06-16 PROCEDURE — 6360000004 HC RX CONTRAST MEDICATION: Performed by: INTERNAL MEDICINE

## 2023-06-16 PROCEDURE — C8929 TTE W OR WO FOL WCON,DOPPLER: HCPCS

## 2023-06-16 PROCEDURE — 93010 ELECTROCARDIOGRAM REPORT: CPT | Performed by: INTERNAL MEDICINE

## 2023-06-16 PROCEDURE — 36415 COLL VENOUS BLD VENIPUNCTURE: CPT

## 2023-06-16 PROCEDURE — 6360000002 HC RX W HCPCS: Performed by: EMERGENCY MEDICINE

## 2023-06-16 PROCEDURE — 99285 EMERGENCY DEPT VISIT HI MDM: CPT

## 2023-06-16 PROCEDURE — 83735 ASSAY OF MAGNESIUM: CPT

## 2023-06-16 PROCEDURE — 83880 ASSAY OF NATRIURETIC PEPTIDE: CPT

## 2023-06-16 PROCEDURE — 81003 URINALYSIS AUTO W/O SCOPE: CPT

## 2023-06-16 RX ORDER — ACETAMINOPHEN 325 MG/1
650 TABLET ORAL EVERY 6 HOURS PRN
Status: DISCONTINUED | OUTPATIENT
Start: 2023-06-16 | End: 2023-06-20 | Stop reason: HOSPADM

## 2023-06-16 RX ORDER — FUROSEMIDE 10 MG/ML
20 INJECTION INTRAMUSCULAR; INTRAVENOUS ONCE
Status: COMPLETED | OUTPATIENT
Start: 2023-06-16 | End: 2023-06-16

## 2023-06-16 RX ORDER — SODIUM CHLORIDE 0.9 % (FLUSH) 0.9 %
5-40 SYRINGE (ML) INJECTION PRN
Status: DISCONTINUED | OUTPATIENT
Start: 2023-06-16 | End: 2023-06-20 | Stop reason: HOSPADM

## 2023-06-16 RX ORDER — FUROSEMIDE 10 MG/ML
40 INJECTION INTRAMUSCULAR; INTRAVENOUS 2 TIMES DAILY
Status: DISCONTINUED | OUTPATIENT
Start: 2023-06-16 | End: 2023-06-20

## 2023-06-16 RX ORDER — ESCITALOPRAM OXALATE 10 MG/1
TABLET ORAL
Qty: 90 TABLET | Refills: 1 | Status: SHIPPED | OUTPATIENT
Start: 2023-06-16

## 2023-06-16 RX ORDER — ATORVASTATIN CALCIUM 40 MG/1
40 TABLET, FILM COATED ORAL NIGHTLY
Status: DISCONTINUED | OUTPATIENT
Start: 2023-06-16 | End: 2023-06-20 | Stop reason: HOSPADM

## 2023-06-16 RX ORDER — ESCITALOPRAM OXALATE 10 MG/1
10 TABLET ORAL DAILY
Status: DISCONTINUED | OUTPATIENT
Start: 2023-06-16 | End: 2023-06-17

## 2023-06-16 RX ORDER — ONDANSETRON 4 MG/1
4 TABLET, ORALLY DISINTEGRATING ORAL EVERY 8 HOURS PRN
Status: DISCONTINUED | OUTPATIENT
Start: 2023-06-16 | End: 2023-06-20 | Stop reason: HOSPADM

## 2023-06-16 RX ORDER — LOSARTAN POTASSIUM 50 MG/1
100 TABLET ORAL DAILY
Status: DISCONTINUED | OUTPATIENT
Start: 2023-06-16 | End: 2023-06-20 | Stop reason: HOSPADM

## 2023-06-16 RX ORDER — ONDANSETRON 2 MG/ML
4 INJECTION INTRAMUSCULAR; INTRAVENOUS EVERY 6 HOURS PRN
Status: DISCONTINUED | OUTPATIENT
Start: 2023-06-16 | End: 2023-06-20 | Stop reason: HOSPADM

## 2023-06-16 RX ORDER — ATORVASTATIN CALCIUM 40 MG/1
40 TABLET, FILM COATED ORAL DAILY
Status: DISCONTINUED | OUTPATIENT
Start: 2023-06-16 | End: 2023-06-16

## 2023-06-16 RX ORDER — ASPIRIN 81 MG/1
81 TABLET, CHEWABLE ORAL DAILY
Status: DISCONTINUED | OUTPATIENT
Start: 2023-06-17 | End: 2023-06-20 | Stop reason: HOSPADM

## 2023-06-16 RX ORDER — SODIUM CHLORIDE 0.9 % (FLUSH) 0.9 %
5-40 SYRINGE (ML) INJECTION EVERY 12 HOURS SCHEDULED
Status: DISCONTINUED | OUTPATIENT
Start: 2023-06-16 | End: 2023-06-20 | Stop reason: HOSPADM

## 2023-06-16 RX ORDER — AMLODIPINE BESYLATE 5 MG/1
5 TABLET ORAL DAILY
Status: DISCONTINUED | OUTPATIENT
Start: 2023-06-16 | End: 2023-06-17

## 2023-06-16 RX ORDER — SODIUM CHLORIDE 9 MG/ML
INJECTION, SOLUTION INTRAVENOUS PRN
Status: DISCONTINUED | OUTPATIENT
Start: 2023-06-16 | End: 2023-06-20 | Stop reason: HOSPADM

## 2023-06-16 RX ORDER — POLYETHYLENE GLYCOL 3350 17 G/17G
17 POWDER, FOR SOLUTION ORAL DAILY PRN
Status: DISCONTINUED | OUTPATIENT
Start: 2023-06-16 | End: 2023-06-20 | Stop reason: HOSPADM

## 2023-06-16 RX ORDER — UREA 10 %
800 LOTION (ML) TOPICAL DAILY
Status: DISCONTINUED | OUTPATIENT
Start: 2023-06-16 | End: 2023-06-16

## 2023-06-16 RX ADMIN — ESCITALOPRAM OXALATE 10 MG: 10 TABLET ORAL at 20:15

## 2023-06-16 RX ADMIN — FUROSEMIDE 40 MG: 10 INJECTION, SOLUTION INTRAMUSCULAR; INTRAVENOUS at 17:40

## 2023-06-16 RX ADMIN — ATORVASTATIN CALCIUM 40 MG: 40 TABLET, FILM COATED ORAL at 20:15

## 2023-06-16 RX ADMIN — SODIUM CHLORIDE, PRESERVATIVE FREE 0.45 ML: 5 INJECTION INTRAVENOUS at 16:14

## 2023-06-16 RX ADMIN — AMLODIPINE BESYLATE 5 MG: 5 TABLET ORAL at 17:40

## 2023-06-16 RX ADMIN — FUROSEMIDE 20 MG: 40 INJECTION, SOLUTION INTRAMUSCULAR; INTRAVENOUS at 11:17

## 2023-06-16 RX ADMIN — LOSARTAN POTASSIUM 100 MG: 50 TABLET, FILM COATED ORAL at 17:40

## 2023-06-16 RX ADMIN — SODIUM CHLORIDE, PRESERVATIVE FREE 10 ML: 5 INJECTION INTRAVENOUS at 20:16

## 2023-06-16 RX ADMIN — APIXABAN 5 MG: 5 TABLET, FILM COATED ORAL at 17:39

## 2023-06-16 ASSESSMENT — PAIN SCALES - GENERAL: PAINLEVEL_OUTOF10: 0

## 2023-06-16 ASSESSMENT — PAIN - FUNCTIONAL ASSESSMENT: PAIN_FUNCTIONAL_ASSESSMENT: 0-10

## 2023-06-17 LAB
ANION GAP SERPL CALC-SCNC: 7 MMOL/L (ref 2–11)
BUN SERPL-MCNC: 20 MG/DL (ref 8–23)
CALCIUM SERPL-MCNC: 8.7 MG/DL (ref 8.3–10.4)
CHLORIDE SERPL-SCNC: 105 MMOL/L (ref 101–110)
CO2 SERPL-SCNC: 26 MMOL/L (ref 21–32)
CREAT SERPL-MCNC: 1.2 MG/DL (ref 0.8–1.5)
ERYTHROCYTE [DISTWIDTH] IN BLOOD BY AUTOMATED COUNT: 12.5 % (ref 11.9–14.6)
GLUCOSE SERPL-MCNC: 92 MG/DL (ref 65–100)
HCT VFR BLD AUTO: 28.2 % (ref 41.1–50.3)
HGB BLD-MCNC: 9.8 G/DL (ref 13.6–17.2)
MAGNESIUM SERPL-MCNC: 1.9 MG/DL (ref 1.8–2.4)
MCH RBC QN AUTO: 33.3 PG (ref 26.1–32.9)
MCHC RBC AUTO-ENTMCNC: 34.8 G/DL (ref 31.4–35)
MCV RBC AUTO: 95.9 FL (ref 82–102)
NRBC # BLD: 0 K/UL (ref 0–0.2)
PLATELET # BLD AUTO: 178 K/UL (ref 150–450)
PMV BLD AUTO: 9.8 FL (ref 9.4–12.3)
POTASSIUM SERPL-SCNC: 3.8 MMOL/L (ref 3.5–5.1)
RBC # BLD AUTO: 2.94 M/UL (ref 4.23–5.6)
SODIUM SERPL-SCNC: 138 MMOL/L (ref 133–143)
WBC # BLD AUTO: 4.8 K/UL (ref 4.3–11.1)

## 2023-06-17 PROCEDURE — G0378 HOSPITAL OBSERVATION PER HR: HCPCS

## 2023-06-17 PROCEDURE — 1100000000 HC RM PRIVATE

## 2023-06-17 PROCEDURE — 6360000002 HC RX W HCPCS: Performed by: PHYSICIAN ASSISTANT

## 2023-06-17 PROCEDURE — 6370000000 HC RX 637 (ALT 250 FOR IP): Performed by: PHYSICIAN ASSISTANT

## 2023-06-17 PROCEDURE — 83735 ASSAY OF MAGNESIUM: CPT

## 2023-06-17 PROCEDURE — 2580000003 HC RX 258: Performed by: PHYSICIAN ASSISTANT

## 2023-06-17 PROCEDURE — 6370000000 HC RX 637 (ALT 250 FOR IP): Performed by: INTERNAL MEDICINE

## 2023-06-17 PROCEDURE — 99232 SBSQ HOSP IP/OBS MODERATE 35: CPT | Performed by: INTERNAL MEDICINE

## 2023-06-17 PROCEDURE — 85027 COMPLETE CBC AUTOMATED: CPT

## 2023-06-17 PROCEDURE — 80048 BASIC METABOLIC PNL TOTAL CA: CPT

## 2023-06-17 PROCEDURE — 96376 TX/PRO/DX INJ SAME DRUG ADON: CPT

## 2023-06-17 PROCEDURE — 36415 COLL VENOUS BLD VENIPUNCTURE: CPT

## 2023-06-17 RX ORDER — ESCITALOPRAM OXALATE 10 MG/1
10 TABLET ORAL NIGHTLY
Status: DISCONTINUED | OUTPATIENT
Start: 2023-06-18 | End: 2023-06-20 | Stop reason: HOSPADM

## 2023-06-17 RX ORDER — AMLODIPINE BESYLATE 10 MG/1
10 TABLET ORAL DAILY
Status: DISCONTINUED | OUTPATIENT
Start: 2023-06-18 | End: 2023-06-20 | Stop reason: HOSPADM

## 2023-06-17 RX ADMIN — ATORVASTATIN CALCIUM 40 MG: 40 TABLET, FILM COATED ORAL at 20:35

## 2023-06-17 RX ADMIN — SODIUM CHLORIDE, PRESERVATIVE FREE 10 ML: 5 INJECTION INTRAVENOUS at 20:35

## 2023-06-17 RX ADMIN — LOSARTAN POTASSIUM 100 MG: 50 TABLET, FILM COATED ORAL at 09:17

## 2023-06-17 RX ADMIN — ASPIRIN 81 MG: 81 TABLET, CHEWABLE ORAL at 09:17

## 2023-06-17 RX ADMIN — APIXABAN 5 MG: 5 TABLET, FILM COATED ORAL at 09:19

## 2023-06-17 RX ADMIN — FUROSEMIDE 40 MG: 10 INJECTION, SOLUTION INTRAMUSCULAR; INTRAVENOUS at 09:17

## 2023-06-17 RX ADMIN — FUROSEMIDE 40 MG: 10 INJECTION, SOLUTION INTRAMUSCULAR; INTRAVENOUS at 17:51

## 2023-06-17 RX ADMIN — APIXABAN 5 MG: 5 TABLET, FILM COATED ORAL at 20:35

## 2023-06-17 RX ADMIN — SODIUM CHLORIDE, PRESERVATIVE FREE 10 ML: 5 INJECTION INTRAVENOUS at 09:21

## 2023-06-17 RX ADMIN — AMLODIPINE BESYLATE 5 MG: 5 TABLET ORAL at 09:17

## 2023-06-17 ASSESSMENT — PAIN SCALES - GENERAL
PAINLEVEL_OUTOF10: 0
PAINLEVEL_OUTOF10: 0

## 2023-06-18 LAB
ANION GAP SERPL CALC-SCNC: 8 MMOL/L (ref 2–11)
BUN SERPL-MCNC: 20 MG/DL (ref 8–23)
CALCIUM SERPL-MCNC: 8.7 MG/DL (ref 8.3–10.4)
CHLORIDE SERPL-SCNC: 103 MMOL/L (ref 101–110)
CO2 SERPL-SCNC: 27 MMOL/L (ref 21–32)
CREAT SERPL-MCNC: 1.2 MG/DL (ref 0.8–1.5)
GLUCOSE SERPL-MCNC: 97 MG/DL (ref 65–100)
MAGNESIUM SERPL-MCNC: 1.9 MG/DL (ref 1.8–2.4)
POTASSIUM SERPL-SCNC: 3.4 MMOL/L (ref 3.5–5.1)
SODIUM SERPL-SCNC: 138 MMOL/L (ref 133–143)

## 2023-06-18 PROCEDURE — 6370000000 HC RX 637 (ALT 250 FOR IP): Performed by: INTERNAL MEDICINE

## 2023-06-18 PROCEDURE — 80048 BASIC METABOLIC PNL TOTAL CA: CPT

## 2023-06-18 PROCEDURE — 1100000000 HC RM PRIVATE

## 2023-06-18 PROCEDURE — 6370000000 HC RX 637 (ALT 250 FOR IP): Performed by: PHYSICIAN ASSISTANT

## 2023-06-18 PROCEDURE — 83735 ASSAY OF MAGNESIUM: CPT

## 2023-06-18 PROCEDURE — 2580000003 HC RX 258: Performed by: PHYSICIAN ASSISTANT

## 2023-06-18 PROCEDURE — 36415 COLL VENOUS BLD VENIPUNCTURE: CPT

## 2023-06-18 PROCEDURE — 6360000002 HC RX W HCPCS: Performed by: PHYSICIAN ASSISTANT

## 2023-06-18 PROCEDURE — 99232 SBSQ HOSP IP/OBS MODERATE 35: CPT | Performed by: INTERNAL MEDICINE

## 2023-06-18 RX ORDER — POTASSIUM CHLORIDE 20 MEQ/1
40 TABLET, EXTENDED RELEASE ORAL ONCE
Status: COMPLETED | OUTPATIENT
Start: 2023-06-18 | End: 2023-06-18

## 2023-06-18 RX ADMIN — FUROSEMIDE 40 MG: 10 INJECTION, SOLUTION INTRAMUSCULAR; INTRAVENOUS at 17:30

## 2023-06-18 RX ADMIN — LOSARTAN POTASSIUM 100 MG: 50 TABLET, FILM COATED ORAL at 09:07

## 2023-06-18 RX ADMIN — AMLODIPINE BESYLATE 10 MG: 10 TABLET ORAL at 09:07

## 2023-06-18 RX ADMIN — ASPIRIN 81 MG: 81 TABLET, CHEWABLE ORAL at 09:07

## 2023-06-18 RX ADMIN — POTASSIUM CHLORIDE 40 MEQ: 20 TABLET, EXTENDED RELEASE ORAL at 11:16

## 2023-06-18 RX ADMIN — ESCITALOPRAM OXALATE 10 MG: 10 TABLET ORAL at 20:14

## 2023-06-18 RX ADMIN — ATORVASTATIN CALCIUM 40 MG: 40 TABLET, FILM COATED ORAL at 20:14

## 2023-06-18 RX ADMIN — SODIUM CHLORIDE, PRESERVATIVE FREE 10 ML: 5 INJECTION INTRAVENOUS at 20:14

## 2023-06-18 RX ADMIN — SODIUM CHLORIDE, PRESERVATIVE FREE 10 ML: 5 INJECTION INTRAVENOUS at 09:08

## 2023-06-18 RX ADMIN — FUROSEMIDE 40 MG: 10 INJECTION, SOLUTION INTRAMUSCULAR; INTRAVENOUS at 09:08

## 2023-06-18 ASSESSMENT — PAIN SCALES - GENERAL: PAINLEVEL_OUTOF10: 0

## 2023-06-19 ENCOUNTER — APPOINTMENT (OUTPATIENT)
Dept: GENERAL RADIOLOGY | Age: 73
DRG: 242 | End: 2023-06-19
Payer: MEDICARE

## 2023-06-19 LAB
ANION GAP SERPL CALC-SCNC: 7 MMOL/L (ref 2–11)
BUN SERPL-MCNC: 20 MG/DL (ref 8–23)
CALCIUM SERPL-MCNC: 8.7 MG/DL (ref 8.3–10.4)
CHLORIDE SERPL-SCNC: 102 MMOL/L (ref 101–110)
CO2 SERPL-SCNC: 29 MMOL/L (ref 21–32)
CREAT SERPL-MCNC: 1.1 MG/DL (ref 0.8–1.5)
ECHO BSA: 2.18 M2
GLUCOSE SERPL-MCNC: 94 MG/DL (ref 65–100)
MAGNESIUM SERPL-MCNC: 1.8 MG/DL (ref 1.8–2.4)
POTASSIUM SERPL-SCNC: 3.7 MMOL/L (ref 3.5–5.1)
SODIUM SERPL-SCNC: 138 MMOL/L (ref 133–143)

## 2023-06-19 PROCEDURE — 36415 COLL VENOUS BLD VENIPUNCTURE: CPT

## 2023-06-19 PROCEDURE — 6360000004 HC RX CONTRAST MEDICATION: Performed by: INTERNAL MEDICINE

## 2023-06-19 PROCEDURE — 6370000000 HC RX 637 (ALT 250 FOR IP): Performed by: INTERNAL MEDICINE

## 2023-06-19 PROCEDURE — 33208 INSRT HEART PM ATRIAL & VENT: CPT | Performed by: INTERNAL MEDICINE

## 2023-06-19 PROCEDURE — 5A2204Z RESTORATION OF CARDIAC RHYTHM, SINGLE: ICD-10-PCS | Performed by: INTERNAL MEDICINE

## 2023-06-19 PROCEDURE — 2580000003 HC RX 258: Performed by: PHYSICIAN ASSISTANT

## 2023-06-19 PROCEDURE — 6360000002 HC RX W HCPCS: Performed by: INTERNAL MEDICINE

## 2023-06-19 PROCEDURE — 2709999900 HC NON-CHARGEABLE SUPPLY: Performed by: INTERNAL MEDICINE

## 2023-06-19 PROCEDURE — C1892 INTRO/SHEATH,FIXED,PEEL-AWAY: HCPCS | Performed by: INTERNAL MEDICINE

## 2023-06-19 PROCEDURE — 02HK3JZ INSERTION OF PACEMAKER LEAD INTO RIGHT VENTRICLE, PERCUTANEOUS APPROACH: ICD-10-PCS | Performed by: INTERNAL MEDICINE

## 2023-06-19 PROCEDURE — 2580000003 HC RX 258: Performed by: INTERNAL MEDICINE

## 2023-06-19 PROCEDURE — 0JH606Z INSERTION OF PACEMAKER, DUAL CHAMBER INTO CHEST SUBCUTANEOUS TISSUE AND FASCIA, OPEN APPROACH: ICD-10-PCS | Performed by: INTERNAL MEDICINE

## 2023-06-19 PROCEDURE — 83735 ASSAY OF MAGNESIUM: CPT

## 2023-06-19 PROCEDURE — B24BZZ4 ULTRASONOGRAPHY OF HEART WITH AORTA, TRANSESOPHAGEAL: ICD-10-PCS | Performed by: INTERNAL MEDICINE

## 2023-06-19 PROCEDURE — L3660 SO 8 AB RSTR CAN/WEB PRE OTS: HCPCS | Performed by: INTERNAL MEDICINE

## 2023-06-19 PROCEDURE — 99153 MOD SED SAME PHYS/QHP EA: CPT | Performed by: INTERNAL MEDICINE

## 2023-06-19 PROCEDURE — 99152 MOD SED SAME PHYS/QHP 5/>YRS: CPT | Performed by: INTERNAL MEDICINE

## 2023-06-19 PROCEDURE — 6370000000 HC RX 637 (ALT 250 FOR IP): Performed by: PHYSICIAN ASSISTANT

## 2023-06-19 PROCEDURE — 6360000002 HC RX W HCPCS: Performed by: PHYSICIAN ASSISTANT

## 2023-06-19 PROCEDURE — 1100000003 HC PRIVATE W/ TELEMETRY

## 2023-06-19 PROCEDURE — 02H63JZ INSERTION OF PACEMAKER LEAD INTO RIGHT ATRIUM, PERCUTANEOUS APPROACH: ICD-10-PCS | Performed by: INTERNAL MEDICINE

## 2023-06-19 PROCEDURE — 71045 X-RAY EXAM CHEST 1 VIEW: CPT

## 2023-06-19 PROCEDURE — 80048 BASIC METABOLIC PNL TOTAL CA: CPT

## 2023-06-19 PROCEDURE — 2500000003 HC RX 250 WO HCPCS: Performed by: INTERNAL MEDICINE

## 2023-06-19 PROCEDURE — C1785 PMKR, DUAL, RATE-RESP: HCPCS | Performed by: INTERNAL MEDICINE

## 2023-06-19 PROCEDURE — C1898 LEAD, PMKR, OTHER THAN TRANS: HCPCS | Performed by: INTERNAL MEDICINE

## 2023-06-19 DEVICE — LEAD 5076-52 MRI US RCMCRD
Type: IMPLANTABLE DEVICE | Status: FUNCTIONAL
Brand: CAPSUREFIX NOVUS MRI™ SURESCAN®

## 2023-06-19 DEVICE — IPG W1DR01 AZURE XT DR MRI USA
Type: IMPLANTABLE DEVICE | Status: FUNCTIONAL
Brand: AZURE™ XT DR MRI SURESCAN™

## 2023-06-19 DEVICE — LEAD 5076-45 MRI US RCMCRD
Type: IMPLANTABLE DEVICE | Status: FUNCTIONAL
Brand: CAPSUREFIX NOVUS MRI™ SURESCAN®

## 2023-06-19 RX ORDER — OXYCODONE HYDROCHLORIDE 5 MG/1
5 TABLET ORAL
Status: COMPLETED | OUTPATIENT
Start: 2023-06-19 | End: 2023-06-19

## 2023-06-19 RX ORDER — LIDOCAINE HYDROCHLORIDE 10 MG/ML
INJECTION, SOLUTION INFILTRATION; PERINEURAL PRN
Status: DISCONTINUED | OUTPATIENT
Start: 2023-06-19 | End: 2023-06-19 | Stop reason: HOSPADM

## 2023-06-19 RX ORDER — MIDAZOLAM HYDROCHLORIDE 1 MG/ML
INJECTION INTRAMUSCULAR; INTRAVENOUS PRN
Status: DISCONTINUED | OUTPATIENT
Start: 2023-06-19 | End: 2023-06-19 | Stop reason: HOSPADM

## 2023-06-19 RX ADMIN — ASPIRIN 81 MG: 81 TABLET, CHEWABLE ORAL at 09:00

## 2023-06-19 RX ADMIN — ESCITALOPRAM OXALATE 10 MG: 10 TABLET ORAL at 20:14

## 2023-06-19 RX ADMIN — OXYCODONE HYDROCHLORIDE 5 MG: 5 TABLET ORAL at 16:43

## 2023-06-19 RX ADMIN — CEFAZOLIN 1000 MG: 1 INJECTION, POWDER, FOR SOLUTION INTRAMUSCULAR; INTRAVENOUS at 20:13

## 2023-06-19 RX ADMIN — ACETAMINOPHEN 650 MG: 325 TABLET ORAL at 16:43

## 2023-06-19 RX ADMIN — ATORVASTATIN CALCIUM 40 MG: 40 TABLET, FILM COATED ORAL at 20:14

## 2023-06-19 RX ADMIN — SODIUM CHLORIDE, PRESERVATIVE FREE 10 ML: 5 INJECTION INTRAVENOUS at 09:01

## 2023-06-19 RX ADMIN — SODIUM CHLORIDE, PRESERVATIVE FREE 5 ML: 5 INJECTION INTRAVENOUS at 20:15

## 2023-06-19 RX ADMIN — FUROSEMIDE 40 MG: 10 INJECTION, SOLUTION INTRAMUSCULAR; INTRAVENOUS at 17:42

## 2023-06-19 ASSESSMENT — PAIN SCALES - GENERAL
PAINLEVEL_OUTOF10: 6
PAINLEVEL_OUTOF10: 0

## 2023-06-19 ASSESSMENT — PAIN DESCRIPTION - LOCATION: LOCATION: INCISION

## 2023-06-19 ASSESSMENT — PAIN DESCRIPTION - DESCRIPTORS: DESCRIPTORS: DISCOMFORT

## 2023-06-19 NOTE — PROGRESS NOTES
TRANSFER - OUT REPORT:    Verbal report given to RN on Virgil Rogers  being transferred to [facility] for routine progression of patient care       Report consisted of patient's Situation, Background, Assessment and   Recommendations(SBAR). Information from the following report(s) Nurse Handoff Report and MAR was reviewed with the receiving nurse.       Dual Chamber Permanent Pacemaker w/ Dr. Cooper Kinney- DDDR   Left Chest Placement  Sutures, dermabond and aquacel    Ancef 2g- pre-procedure  Versed 4mg IV

## 2023-06-19 NOTE — PROGRESS NOTES
TRANSFER - OUT REPORT:    Verbal report given to [facility] AUTHORITY RN on Edyta Julian  being transferred to [facility] for routine progression of patient care       Report consisted of patient's Situation, Background, Assessment and   Recommendations(SBAR). Information from the following report(s) Nurse Handoff Report was reviewed with the receiving nurse. Lines:   Peripheral IV 06/16/23 Right Antecubital (Active)   Site Assessment Clean, dry & intact 06/19/23 0910   Line Status Capped;Flushed 06/19/23 0910   Line Care Cap changed 06/19/23 0910   Phlebitis Assessment No symptoms 06/19/23 0910   Infiltration Assessment 0 06/19/23 0910   Alcohol Cap Used Yes 06/19/23 0910   Dressing Status Clean, dry & intact 06/19/23 0910   Dressing Type Transparent 06/19/23 0910       Peripheral IV 06/18/23 Left;Distal Forearm (Active)   Site Assessment Clean, dry & intact 06/19/23 0910   Line Status Capped;Flushed 06/19/23 0910   Line Care Cap changed 06/19/23 0910   Phlebitis Assessment No symptoms 06/19/23 0910   Infiltration Assessment 0 06/19/23 0910   Alcohol Cap Used Yes 06/19/23 0910   Dressing Status Clean, dry & intact 06/19/23 0910   Dressing Type Transparent 06/19/23 0910   Dressing Intervention New 06/18/23 1128        Opportunity for questions and clarification was provided.       Patient transported with:  G[employer]

## 2023-06-19 NOTE — CARE COORDINATION
Pt presented to the ED c/o SOB, LE and abdominal edema. Has a h/o HTN and HLD. Pt recently tripped on some rocks, fell and hit his head. Pt was seen in the ED and in NSR/SB, CT head and neck w occipital scalp hematoma. Pt admitted for volume overload. PTA, pt indep with his ADLs. Lives with his spouse. On RA. PCP confirmed. [place] Medicare verified and able to afford home meds. Will continue to follow for potential d/c needs. 06/19/23 1055   Service Assessment   Patient Orientation Alert and Oriented   Cognition Alert   History Provided By Patient   Primary Caregiver Self   Support Systems Spouse/Significant Other;Children;Family Members;[employer] [employer];Friends/Neighbors   PCP Verified by CM Yes  (Lollis)   Prior Functional Level Independent in ADLs/IADLs   Current Functional Level Independent in ADLs/IADLs   Can patient return to prior living arrangement Yes   Ability to make needs known: Good   Family able to assist with home care needs: Yes   Would you like for me to discuss the discharge plan with any other family members/significant others, and if so, who? No   Financial Resources Medicare   Community Resources None   Social/Functional History   Lives With Spouse   [facility] From Family   ADL Assistance Independent   Ambulation Assistance Independent   Transfer Assistance Independent   Active  Yes   Mode of Transportation Car   Occupation Retired   Discharge Planning   Current Services Prior To Admission None   Potential Assistance Needed N/A   DME Ordered? No   Potential Assistance Purchasing Medications No   Type of Home Care Services None   Services At/After Discharge   Transition of Care Consult (CM Consult) Discharge [facility] Discharge None    Resource Information Provided?  No   Mode of Transport at Discharge Other (see comment)  (Family)   Confirm Follow Up Transport Family

## 2023-06-20 VITALS
HEIGHT: 68 IN | WEIGHT: 207.7 LBS | RESPIRATION RATE: 20 BRPM | OXYGEN SATURATION: 94 % | BODY MASS INDEX: 31.48 KG/M2 | SYSTOLIC BLOOD PRESSURE: 116 MMHG | HEART RATE: 59 BPM | TEMPERATURE: 98.2 F | DIASTOLIC BLOOD PRESSURE: 58 MMHG

## 2023-06-20 PROBLEM — Z95.0 STATUS POST BIVENTRICULAR PACEMAKER: Status: ACTIVE | Noted: 2023-06-20

## 2023-06-20 PROBLEM — E87.70 VOLUME OVERLOAD: Status: RESOLVED | Noted: 2023-06-16 | Resolved: 2023-06-20

## 2023-06-20 PROBLEM — R00.1 BRADYCARDIA: Status: RESOLVED | Noted: 2019-12-19 | Resolved: 2023-06-20

## 2023-06-20 PROBLEM — R00.1 BRADYCARDIA: Status: ACTIVE | Noted: 2019-12-19

## 2023-06-20 PROBLEM — I27.20 PULMONARY HTN (HCC): Status: ACTIVE | Noted: 2023-06-20

## 2023-06-20 LAB
ANION GAP SERPL CALC-SCNC: 8 MMOL/L (ref 2–11)
BUN SERPL-MCNC: 17 MG/DL (ref 8–23)
CALCIUM SERPL-MCNC: 8.9 MG/DL (ref 8.3–10.4)
CHLORIDE SERPL-SCNC: 104 MMOL/L (ref 101–110)
CO2 SERPL-SCNC: 26 MMOL/L (ref 21–32)
CREAT SERPL-MCNC: 1.2 MG/DL (ref 0.8–1.5)
EKG ATRIAL RATE: 61 BPM
EKG DIAGNOSIS: NORMAL
EKG Q-T INTERVAL: 530 MS
EKG QRS DURATION: 212 MS
EKG QTC CALCULATION (BAZETT): 533 MS
EKG R AXIS: -64 DEGREES
EKG T AXIS: 97 DEGREES
EKG VENTRICULAR RATE: 61 BPM
GLUCOSE SERPL-MCNC: 100 MG/DL (ref 65–100)
MAGNESIUM SERPL-MCNC: 1.8 MG/DL (ref 1.8–2.4)
POTASSIUM SERPL-SCNC: 3.6 MMOL/L (ref 3.5–5.1)
SODIUM SERPL-SCNC: 138 MMOL/L (ref 133–143)

## 2023-06-20 PROCEDURE — 6370000000 HC RX 637 (ALT 250 FOR IP): Performed by: INTERNAL MEDICINE

## 2023-06-20 PROCEDURE — 6370000000 HC RX 637 (ALT 250 FOR IP): Performed by: PHYSICIAN ASSISTANT

## 2023-06-20 PROCEDURE — 83735 ASSAY OF MAGNESIUM: CPT

## 2023-06-20 PROCEDURE — 80048 BASIC METABOLIC PNL TOTAL CA: CPT

## 2023-06-20 PROCEDURE — 99238 HOSP IP/OBS DSCHRG MGMT 30/<: CPT | Performed by: INTERNAL MEDICINE

## 2023-06-20 PROCEDURE — 2580000003 HC RX 258: Performed by: INTERNAL MEDICINE

## 2023-06-20 PROCEDURE — 36415 COLL VENOUS BLD VENIPUNCTURE: CPT

## 2023-06-20 PROCEDURE — 2580000003 HC RX 258: Performed by: PHYSICIAN ASSISTANT

## 2023-06-20 PROCEDURE — 93005 ELECTROCARDIOGRAM TRACING: CPT | Performed by: INTERNAL MEDICINE

## 2023-06-20 PROCEDURE — 6360000002 HC RX W HCPCS: Performed by: PHYSICIAN ASSISTANT

## 2023-06-20 PROCEDURE — 6360000002 HC RX W HCPCS: Performed by: INTERNAL MEDICINE

## 2023-06-20 RX ORDER — AMLODIPINE BESYLATE 10 MG/1
10 TABLET ORAL DAILY
Qty: 30 TABLET | Refills: 3 | Status: SHIPPED | OUTPATIENT
Start: 2023-06-21

## 2023-06-20 RX ORDER — FUROSEMIDE 40 MG/1
40 TABLET ORAL DAILY
Qty: 60 TABLET | Refills: 3 | Status: SHIPPED | OUTPATIENT
Start: 2023-06-20

## 2023-06-20 RX ORDER — LOSARTAN POTASSIUM 100 MG/1
100 TABLET ORAL DAILY
Qty: 30 TABLET | Refills: 3 | Status: SHIPPED | OUTPATIENT
Start: 2023-06-21

## 2023-06-20 RX ORDER — FUROSEMIDE 40 MG/1
40 TABLET ORAL DAILY
Status: DISCONTINUED | OUTPATIENT
Start: 2023-06-20 | End: 2023-06-20 | Stop reason: HOSPADM

## 2023-06-20 RX ADMIN — CEFAZOLIN 1000 MG: 1 INJECTION, POWDER, FOR SOLUTION INTRAMUSCULAR; INTRAVENOUS at 03:32

## 2023-06-20 RX ADMIN — FUROSEMIDE 40 MG: 10 INJECTION, SOLUTION INTRAMUSCULAR; INTRAVENOUS at 08:35

## 2023-06-20 RX ADMIN — SODIUM CHLORIDE, PRESERVATIVE FREE 10 ML: 5 INJECTION INTRAVENOUS at 11:17

## 2023-06-20 RX ADMIN — ASPIRIN 81 MG: 81 TABLET, CHEWABLE ORAL at 08:34

## 2023-06-20 RX ADMIN — FUROSEMIDE 40 MG: 40 TABLET ORAL at 14:42

## 2023-06-20 RX ADMIN — AMLODIPINE BESYLATE 10 MG: 10 TABLET ORAL at 08:34

## 2023-06-20 RX ADMIN — LOSARTAN POTASSIUM 100 MG: 50 TABLET, FILM COATED ORAL at 08:34

## 2023-06-20 ASSESSMENT — PAIN SCALES - GENERAL
PAINLEVEL_OUTOF10: 0
PAINLEVEL_OUTOF10: 0

## 2023-06-20 NOTE — DISCHARGE INSTRUCTIONS
DISPOSITION: The patient is being discharged home in stable condition on a low saturated fat, low cholesterol and low salt diet. The patient is instructed to advance activities as tolerated to the limit of fatigue or shortness of breath. The patient is informed to monitor daily weights and maintain a 2 liter per day fluid restriction. The patient is instructed to call the office for any shortness of breath, weight gain, or increased peripheral edema.

## 2023-06-20 NOTE — PLAN OF CARE
Problem: Discharge Planning  Goal: Discharge to home or other facility with appropriate resources  6/20/2023 1506 by Blanca Brownlee RN  Outcome: Completed  6/20/2023 0405 by Yohana Harris RN  Outcome: Adequate for Discharge  Flowsheets (Taken 6/19/2023 2005)  Discharge to home or other facility with appropriate resources: Identify barriers to discharge with patient and caregiver     Problem: Pain  Goal: Verbalizes/displays adequate comfort level or baseline comfort level  6/20/2023 1506 by Blanca Brownlee RN  Outcome: Completed  6/20/2023 0405 by Yohana Harris RN  Outcome: Adequate for Discharge     Problem: Safety - Adult  Goal: Free from fall injury  6/20/2023 1506 by Blanca Brownlee RN  Outcome: Completed  6/20/2023 0405 by Yohana Harris RN  Outcome: Adequate for Discharge  Flowsheets (Taken 6/19/2023 2005)  Free From Fall Injury: Instruct family/caregiver on patient safety     Problem: ABCDS Injury Assessment  Goal: Absence of physical injury  6/20/2023 1506 by Blanca Brownlee RN  Outcome: Completed  6/20/2023 0405 by Yohana Harris RN  Outcome: Adequate for Discharge

## 2023-06-20 NOTE — DISCHARGE SUMMARY
In this split/shared evaluation I performed reviewed the patients's H&P, available images, labs, cultures. , discussed case in detail with ACP, performed a medically appropriate history and exam, counseled and educated the patient and/or family member, ordered and/or reviewed medications, tests or procedures, documented information in EMR, independently interpreted images and coordinated care. Personal Time: 30 minutes -this equates to greater than 50% of total time in patient consultation/care. Patient seen and examined by me. Agree with above note by physician extender. Key findings are: 51-year-old gentleman admitted with bradycardia, atrial fibrillation, and volume overload. Patient quickly responded to gentle diuresis. He is now status post dual-chamber pacemaker. Patient has recovered well. Underlying still has atrial fibrillation atrial flutter. Patient is asymptomatic up ambulating in room. Vitals:    06/20/23 0834 06/20/23 0945 06/20/23 1203 06/20/23 1442   BP: 109/61 113/67 116/67 (!) 116/58   Pulse:  59     Resp:  18 20    Temp:  98.1 °F (36.7 °C) 98.2 °F (36.8 °C)    TempSrc:  Oral Oral    SpO2:  97% 94%    Weight:       Height:            General: Patient is alert and oriented, no apparent distress  HEENT: Pupils equal reactive, oropharynx clear  Chest: Clear to auscultation bilaterally  Cardiovascular: S1-S2 regular with no murmur  Abdomen: Soft positive bowel sounds  Extremities: Soft no edema with intact distal pulses    Active Problems:    Atrial fibrillation (HCC)  Plan: Patient is stable on Eliquis 5 mg twice daily. We will allow pacemaker site to heal and leads to stabilize at which time patient can be considered for cardioversion in the next 2 to 4 weeks. Patient appears to be favorable with mildly dilated left atrium and no other high risk findings for permanent atrial fibrillation.     Cardiac pacemaker  Plan: Normal function per implant    Pulmonary HTN ([address]  Plan:

## 2023-06-20 NOTE — CARE COORDINATION
Discharge order is in. Pt is discharging home today in stable condition. No discharge needs identified. Tx goals met.     06/20/23 1517   Services At/After Discharge   Transition of Care Consult (CM Consult) Discharge [facility] Discharge None   [employer] Information Provided? No   Mode of Transport at Discharge Other (see comment)  (Family)   Confirm Follow Up Transport Family   Condition of Participation: Discharge Planning   The Patient and/or Patient Representative was provided with a Choice of Provider? Patient   The Patient and/Or Patient Representative agree with the Discharge Plan? Yes   Freedom of Choice list was provided with basic dialogue that supports the patient's individualized plan of care/goals, treatment preferences, and shares the quality data associated with the providers?   Yes

## 2023-06-20 NOTE — PLAN OF CARE
Problem: Discharge Planning  Goal: Discharge to home or other facility with appropriate resources  Outcome: Adequate for Discharge  Flowsheets (Taken 6/19/2023 2005)  Discharge to home or other facility with appropriate resources: Identify barriers to discharge with patient and caregiver     Problem: Pain  Goal: Verbalizes/displays adequate comfort level or baseline comfort level  Outcome: Adequate for Discharge     Problem: Safety - Adult  Goal: Free from fall injury  Outcome: Adequate for Discharge  Flowsheets (Taken 6/19/2023 2005)  Free From Fall Injury: Instruct family/caregiver on patient safety     Problem: ABCDS Injury Assessment  Goal: Absence of physical injury  Outcome: Adequate for Discharge

## 2023-06-21 ENCOUNTER — TELEPHONE (OUTPATIENT)
Age: 73
End: 2023-06-21

## 2023-06-21 ASSESSMENT — ENCOUNTER SYMPTOMS: SHORTNESS OF BREATH: 1

## 2023-06-21 NOTE — TELEPHONE ENCOUNTER
Called pt for TC. Voiced no complaints. Reviewed discharge instructions. Pt voiced understanding of date,time and location of appointments. Instructed to return to the ER if any sob or chest pain.   Pt agreed to do so./wc

## 2023-06-23 ENCOUNTER — TELEPHONE (OUTPATIENT)
Dept: FAMILY MEDICINE CLINIC | Facility: CLINIC | Age: 73
End: 2023-06-23

## 2023-06-24 SDOH — ECONOMIC STABILITY: FOOD INSECURITY: WITHIN THE PAST 12 MONTHS, THE FOOD YOU BOUGHT JUST DIDN'T LAST AND YOU DIDN'T HAVE MONEY TO GET MORE.: NEVER TRUE

## 2023-06-24 SDOH — ECONOMIC STABILITY: HOUSING INSECURITY
IN THE LAST 12 MONTHS, WAS THERE A TIME WHEN YOU DID NOT HAVE A STEADY PLACE TO SLEEP OR SLEPT IN A SHELTER (INCLUDING NOW)?: NO

## 2023-06-24 SDOH — ECONOMIC STABILITY: INCOME INSECURITY: HOW HARD IS IT FOR YOU TO PAY FOR THE VERY BASICS LIKE FOOD, HOUSING, MEDICAL CARE, AND HEATING?: NOT VERY HARD

## 2023-06-24 SDOH — ECONOMIC STABILITY: FOOD INSECURITY: WITHIN THE PAST 12 MONTHS, YOU WORRIED THAT YOUR FOOD WOULD RUN OUT BEFORE YOU GOT MONEY TO BUY MORE.: NEVER TRUE

## 2023-06-24 SDOH — ECONOMIC STABILITY: TRANSPORTATION INSECURITY
IN THE PAST 12 MONTHS, HAS LACK OF TRANSPORTATION KEPT YOU FROM MEETINGS, WORK, OR FROM GETTING THINGS NEEDED FOR DAILY LIVING?: NO

## 2023-06-27 ENCOUNTER — OFFICE VISIT (OUTPATIENT)
Dept: FAMILY MEDICINE CLINIC | Facility: CLINIC | Age: 73
End: 2023-06-27

## 2023-06-27 VITALS
OXYGEN SATURATION: 98 % | TEMPERATURE: 98.2 F | RESPIRATION RATE: 18 BRPM | BODY MASS INDEX: 30.92 KG/M2 | SYSTOLIC BLOOD PRESSURE: 120 MMHG | DIASTOLIC BLOOD PRESSURE: 62 MMHG | WEIGHT: 204 LBS | HEART RATE: 64 BPM | HEIGHT: 68 IN

## 2023-06-27 DIAGNOSIS — Z95.0 CARDIAC PACEMAKER: ICD-10-CM

## 2023-06-27 DIAGNOSIS — I48.91 ATRIAL FIBRILLATION, UNSPECIFIED TYPE (HCC): ICD-10-CM

## 2023-06-27 DIAGNOSIS — D64.89 HYPERCHROMIC ANEMIA: ICD-10-CM

## 2023-06-27 DIAGNOSIS — R74.01 TRANSAMINITIS: ICD-10-CM

## 2023-06-27 DIAGNOSIS — R00.1 SINUS BRADYCARDIA: ICD-10-CM

## 2023-06-27 DIAGNOSIS — I35.8 NONRHEUMATIC AORTIC VALVE SCLEROSIS: ICD-10-CM

## 2023-06-27 DIAGNOSIS — E66.09 CLASS 1 OBESITY DUE TO EXCESS CALORIES WITH SERIOUS COMORBIDITY AND BODY MASS INDEX (BMI) OF 31.0 TO 31.9 IN ADULT: ICD-10-CM

## 2023-06-27 DIAGNOSIS — E87.79 VOLUME OVERLOAD STATE OF HEART: Primary | ICD-10-CM

## 2023-06-27 DIAGNOSIS — I27.20 PULMONARY HTN (HCC): ICD-10-CM

## 2023-06-27 DIAGNOSIS — B35.1 ONYCHOMYCOSIS: ICD-10-CM

## 2023-06-27 RX ORDER — TERBINAFINE HYDROCHLORIDE 250 MG/1
250 TABLET ORAL DAILY
Qty: 90 TABLET | Refills: 0 | Status: SHIPPED | OUTPATIENT
Start: 2023-06-27 | End: 2023-09-25

## 2023-06-27 ASSESSMENT — PATIENT HEALTH QUESTIONNAIRE - PHQ9
1. LITTLE INTEREST OR PLEASURE IN DOING THINGS: 0
SUM OF ALL RESPONSES TO PHQ9 QUESTIONS 1 & 2: 0
SUM OF ALL RESPONSES TO PHQ QUESTIONS 1-9: 0
2. FEELING DOWN, DEPRESSED OR HOPELESS: 0
SUM OF ALL RESPONSES TO PHQ QUESTIONS 1-9: 0

## 2023-07-06 ENCOUNTER — OFFICE VISIT (OUTPATIENT)
Age: 73
End: 2023-07-06

## 2023-07-06 ENCOUNTER — NURSE ONLY (OUTPATIENT)
Age: 73
End: 2023-07-06

## 2023-07-06 VITALS
HEART RATE: 80 BPM | WEIGHT: 209.3 LBS | BODY MASS INDEX: 31.72 KG/M2 | SYSTOLIC BLOOD PRESSURE: 104 MMHG | DIASTOLIC BLOOD PRESSURE: 60 MMHG | HEIGHT: 68 IN

## 2023-07-06 DIAGNOSIS — R09.89 LABILE HYPERTENSION: ICD-10-CM

## 2023-07-06 DIAGNOSIS — I10 ESSENTIAL HYPERTENSION: ICD-10-CM

## 2023-07-06 DIAGNOSIS — I44.2 INTERMITTENT COMPLETE ATRIOVENTRICULAR BLOCK (HCC): Primary | ICD-10-CM

## 2023-07-06 DIAGNOSIS — I48.0 PAROXYSMAL ATRIAL FIBRILLATION (HCC): ICD-10-CM

## 2023-07-06 DIAGNOSIS — Z95.0 CARDIAC PACEMAKER: Primary | ICD-10-CM

## 2023-07-06 DIAGNOSIS — E78.00 PURE HYPERCHOLESTEROLEMIA: ICD-10-CM

## 2023-07-06 DIAGNOSIS — I35.8 NONRHEUMATIC AORTIC VALVE SCLEROSIS: ICD-10-CM

## 2023-07-06 RX ORDER — CHOLECALCIFEROL (VITAMIN D3) 125 MCG
CAPSULE ORAL
COMMUNITY

## 2023-07-06 RX ORDER — AMLODIPINE BESYLATE 10 MG/1
10 TABLET ORAL DAILY
Qty: 90 TABLET | Refills: 3 | Status: SHIPPED | OUTPATIENT
Start: 2023-07-06

## 2023-07-06 RX ORDER — ATORVASTATIN CALCIUM 40 MG/1
40 TABLET, FILM COATED ORAL DAILY
Qty: 90 TABLET | Refills: 3 | Status: SHIPPED | OUTPATIENT
Start: 2023-07-06

## 2023-07-06 RX ORDER — NEBIVOLOL 20 MG/1
TABLET ORAL
Qty: 90 TABLET | Refills: 3 | OUTPATIENT
Start: 2023-07-06

## 2023-07-06 RX ORDER — LOSARTAN POTASSIUM 100 MG/1
100 TABLET ORAL DAILY
Qty: 90 TABLET | Refills: 3 | Status: SHIPPED | OUTPATIENT
Start: 2023-07-06

## 2023-07-06 ASSESSMENT — ENCOUNTER SYMPTOMS
WHEEZING: 0
BLURRED VISION: 0
VOMITING: 0
SPUTUM PRODUCTION: 0
VISUAL CHANGE: 0
HEMATEMESIS: 0
HEMATOCHEZIA: 0
ABDOMINAL PAIN: 0
COUGH: 0
HOARSE VOICE: 0
DIARRHEA: 0
COLOR CHANGE: 0
SWOLLEN GLANDS: 0
SORE THROAT: 0
SHORTNESS OF BREATH: 1
NAUSEA: 0
BOWEL INCONTINENCE: 0
CHANGE IN BOWEL HABIT: 0
ORTHOPNEA: 0

## 2023-07-06 NOTE — PROGRESS NOTES
[facility] CARDIOLOGY  [id number] [place] [address]  PHONE: [phone]        23        NAME:  Kelley Mcdonald  : 1950  MRN: [id number]       SUBJECTIVE:   Kelley Mcdonald is a 72 y.o. male seen for a follow up visit regarding the following: The patient has a hx mild AS,CAD,and primary hypertension. Recently,the patient was admitted with volume overload and AF/bradycardia. .Recent hospital echo reported LV EF=58%,aortic sclerosis,mild MR & TR,and RVSP=61 mmHg. He underwent pacemaker insertion. He returns for transitional care follow up and pacemaker interrogation. Chief Complaint   Patient presents with    Follow-Up from Hospital     Volume overload state of heart        HPI:    Edema  This is a new problem. The problem has been gradually improving. Associated symptoms include fatigue. Pertinent negatives include no abdominal pain, anorexia, arthralgias, change in bowel habit, chest pain, chills, congestion, coughing, diaphoresis, fever, headaches, joint swelling, myalgias, nausea, neck pain, numbness, rash, sore throat, swollen glands, urinary symptoms, vertigo, visual change, vomiting or weakness. Past Medical History, Past Surgical History, Family history, Social History, and Medications were all reviewed with the patient today and updated as necessary.          Current Outpatient Medications:     Cholecalciferol (VITAMIN D) 125 MCG (5000 UT) CAPS, Take by mouth, Disp: , Rfl:     POTASSIUM GLUCONATE PO, Take by mouth, Disp: , Rfl:     losartan (COZAAR) 100 MG tablet, Take 1 tablet by mouth daily, Disp: 90 tablet, Rfl: 3    atorvastatin (LIPITOR) 40 MG tablet, Take 1 tablet by mouth daily, Disp: 90 tablet, Rfl: 3    amLODIPine (NORVASC) 10 MG tablet, Take 1 tablet by mouth daily, Disp: 90 tablet, Rfl: 3    apixaban (ELIQUIS) 5 MG TABS tablet, Take 1 tablet by mouth 2 times daily, Disp: 60 tablet, Rfl: 5    furosemide (LASIX) 40 MG tablet, Take 1 tablet by mouth

## 2023-07-10 ENCOUNTER — NURSE ONLY (OUTPATIENT)
Dept: FAMILY MEDICINE CLINIC | Facility: CLINIC | Age: 73
End: 2023-07-10

## 2023-07-10 DIAGNOSIS — E87.79 VOLUME OVERLOAD STATE OF HEART: ICD-10-CM

## 2023-07-10 DIAGNOSIS — R74.01 TRANSAMINITIS: ICD-10-CM

## 2023-07-10 DIAGNOSIS — I25.10 ATHEROSCLEROSIS OF NATIVE CORONARY ARTERY OF NATIVE HEART WITHOUT ANGINA PECTORIS: Primary | ICD-10-CM

## 2023-07-10 DIAGNOSIS — D64.89 HYPERCHROMIC ANEMIA: ICD-10-CM

## 2023-07-10 DIAGNOSIS — I25.10 ATHEROSCLEROSIS OF NATIVE CORONARY ARTERY OF NATIVE HEART WITHOUT ANGINA PECTORIS: ICD-10-CM

## 2023-07-10 DIAGNOSIS — I48.91 ATRIAL FIBRILLATION, UNSPECIFIED TYPE (HCC): ICD-10-CM

## 2023-07-10 LAB
ALBUMIN SERPL-MCNC: 3.8 G/DL (ref 3.2–4.6)
ALBUMIN/GLOB SERPL: 1.4 (ref 0.4–1.6)
ALP SERPL-CCNC: 70 U/L (ref 50–136)
ALT SERPL-CCNC: 32 U/L (ref 12–65)
ANION GAP SERPL CALC-SCNC: 6 MMOL/L (ref 2–11)
AST SERPL-CCNC: 24 U/L (ref 15–37)
BASOPHILS # BLD: 0 K/UL (ref 0–0.2)
BASOPHILS NFR BLD: 1 % (ref 0–2)
BILIRUB SERPL-MCNC: 0.9 MG/DL (ref 0.2–1.1)
BUN SERPL-MCNC: 28 MG/DL (ref 8–23)
CALCIUM SERPL-MCNC: 9.3 MG/DL (ref 8.3–10.4)
CHLORIDE SERPL-SCNC: 107 MMOL/L (ref 101–110)
CO2 SERPL-SCNC: 27 MMOL/L (ref 21–32)
CREAT SERPL-MCNC: 1.2 MG/DL (ref 0.8–1.5)
DIFFERENTIAL METHOD BLD: ABNORMAL
EOSINOPHIL # BLD: 0.2 K/UL (ref 0–0.8)
EOSINOPHIL NFR BLD: 5 % (ref 0.5–7.8)
ERYTHROCYTE [DISTWIDTH] IN BLOOD BY AUTOMATED COUNT: 12.4 % (ref 11.9–14.6)
GLOBULIN SER CALC-MCNC: 2.8 G/DL (ref 2.8–4.5)
GLUCOSE SERPL-MCNC: 92 MG/DL (ref 65–100)
HCT VFR BLD AUTO: 31.8 % (ref 41.1–50.3)
HGB BLD-MCNC: 10.8 G/DL (ref 13.6–17.2)
IMM GRANULOCYTES # BLD AUTO: 0 K/UL (ref 0–0.5)
IMM GRANULOCYTES NFR BLD AUTO: 0 % (ref 0–5)
LYMPHOCYTES # BLD: 1.4 K/UL (ref 0.5–4.6)
LYMPHOCYTES NFR BLD: 34 % (ref 13–44)
MAGNESIUM SERPL-MCNC: 2 MG/DL (ref 1.8–2.4)
MCH RBC QN AUTO: 32.1 PG (ref 26.1–32.9)
MCHC RBC AUTO-ENTMCNC: 34 G/DL (ref 31.4–35)
MCV RBC AUTO: 94.6 FL (ref 82–102)
MONOCYTES # BLD: 0.5 K/UL (ref 0.1–1.3)
MONOCYTES NFR BLD: 11 % (ref 4–12)
NEUTS SEG # BLD: 2 K/UL (ref 1.7–8.2)
NEUTS SEG NFR BLD: 49 % (ref 43–78)
NRBC # BLD: 0 K/UL (ref 0–0.2)
PLATELET # BLD AUTO: 195 K/UL (ref 150–450)
PMV BLD AUTO: 10.1 FL (ref 9.4–12.3)
POTASSIUM SERPL-SCNC: 5.1 MMOL/L (ref 3.5–5.1)
PROT SERPL-MCNC: 6.6 G/DL (ref 6.3–8.2)
RBC # BLD AUTO: 3.36 M/UL (ref 4.23–5.6)
SODIUM SERPL-SCNC: 140 MMOL/L (ref 133–143)
WBC # BLD AUTO: 4.2 K/UL (ref 4.3–11.1)

## 2023-07-28 ENCOUNTER — TELEPHONE (OUTPATIENT)
Age: 73
End: 2023-07-28

## 2023-07-28 NOTE — TELEPHONE ENCOUNTER
Patients wife called stating her  is experiencing the following symptoms :    Edema bilaterally in feet, ankles and calves  Weight gain of 4 lbs since Wednesday  Denies SOB or cough  Has pacemaker

## 2023-07-28 NOTE — TELEPHONE ENCOUNTER
Reviewed pt's concerns with Dr. Charline Jaimes. States have pt double his lasix for the next 3-4 days until weight back to baseline and edema improved. Reviewed with pt's wife. Encouraged to have pt hydrate to 2L daily. Check in on Monday--mychart if improved, call if worse or no better. Verb understanding.

## 2023-07-28 NOTE — TELEPHONE ENCOUNTER
Pt's wife describes pt having edema in feet and ankles, 4# weight gain over past 2 days. States edema is worse at night after pt is on his feet all day, but wife states edema really didn't improve much overnight last night. States pt is careful with his sodium intake. Taking furosemide 40 mg daily usually with brisk diuresis, but unsure about the past couple days. Recommend compression socks, fluid restriction up to 2L per day, elevate as much as possible. Wife states pt is not having CP or SOB.

## 2023-08-11 NOTE — TELEPHONE ENCOUNTER
Care Transitions Initial Follow Up Call    Outreach made within 2 business days of discharge: Yes    Patient: Roney Tanner Patient : 1950   MRN: [id number]  Reason for Admission: SOB, Feet and legs Swollen, Pace Maker placed   Discharge Date: 23       Spoke with: Wife    Discharge department/facility: [facility] Interactive Patient Contact:  Was patient able to fill all prescriptions: Yes  Was patient instructed to bring all medications to the follow-up visit: Yes  Is patient taking all medications as directed in the discharge summary? Yes  Does patient understand their discharge instructions: Yes  Does patient have questions or concerns that need addressed prior to 7-14 day follow up office visit: no    Scheduled appointment with PCP within 7-14 days    Follow Up  Future Appointments   Date Time Provider [address] [place] [address]   2023  2:30 PM Esperanza Dunn PA-C LILIYA [facility] AMB   2023  9:00 AM [facility] DEVICE 39 DG GVL AMB   2023  9:30 AM Deborah Lanza MD DG [facility] AMB   11/15/2023 10:30 AM Sadia Neves MD [facility]       Kar Joe MA
Patient was discharged from [facility] on 06/20/2023 and was told to f/u in 7 days.      D/C DATE: 06/20/2023     D/C FROM: [facility]    D/C TO: Home    PHONE NUMBER TO REACH PATIENT: [phone]    F/U APPT DATE & TIME: 06/27/2023 at 2:30 pm with Sujey Manning
Impaired

## 2023-10-03 PROCEDURE — 93296 REM INTERROG EVL PM/IDS: CPT | Performed by: INTERNAL MEDICINE

## 2023-10-03 PROCEDURE — 93294 REM INTERROG EVL PM/LDLS PM: CPT | Performed by: INTERNAL MEDICINE

## 2023-10-06 ENCOUNTER — OFFICE VISIT (OUTPATIENT)
Age: 73
End: 2023-10-06
Payer: MEDICARE

## 2023-10-06 VITALS
DIASTOLIC BLOOD PRESSURE: 76 MMHG | WEIGHT: 211 LBS | SYSTOLIC BLOOD PRESSURE: 130 MMHG | HEART RATE: 80 BPM | BODY MASS INDEX: 31.98 KG/M2 | HEIGHT: 68 IN

## 2023-10-06 DIAGNOSIS — I25.10 ATHEROSCLEROSIS OF NATIVE CORONARY ARTERY OF NATIVE HEART WITHOUT ANGINA PECTORIS: ICD-10-CM

## 2023-10-06 DIAGNOSIS — I48.0 PAROXYSMAL ATRIAL FIBRILLATION (HCC): ICD-10-CM

## 2023-10-06 DIAGNOSIS — I35.0 NONRHEUMATIC AORTIC VALVE STENOSIS: Primary | ICD-10-CM

## 2023-10-06 DIAGNOSIS — I50.32 CHRONIC DIASTOLIC CONGESTIVE HEART FAILURE (HCC): ICD-10-CM

## 2023-10-06 DIAGNOSIS — Z95.0 CARDIAC PACEMAKER: ICD-10-CM

## 2023-10-06 PROCEDURE — 3017F COLORECTAL CA SCREEN DOC REV: CPT | Performed by: INTERNAL MEDICINE

## 2023-10-06 PROCEDURE — G8417 CALC BMI ABV UP PARAM F/U: HCPCS | Performed by: INTERNAL MEDICINE

## 2023-10-06 PROCEDURE — 99214 OFFICE O/P EST MOD 30 MIN: CPT | Performed by: INTERNAL MEDICINE

## 2023-10-06 PROCEDURE — G8427 DOCREV CUR MEDS BY ELIG CLIN: HCPCS | Performed by: INTERNAL MEDICINE

## 2023-10-06 PROCEDURE — 1123F ACP DISCUSS/DSCN MKR DOCD: CPT | Performed by: INTERNAL MEDICINE

## 2023-10-06 PROCEDURE — 3078F DIAST BP <80 MM HG: CPT | Performed by: INTERNAL MEDICINE

## 2023-10-06 PROCEDURE — G8484 FLU IMMUNIZE NO ADMIN: HCPCS | Performed by: INTERNAL MEDICINE

## 2023-10-06 PROCEDURE — 4004F PT TOBACCO SCREEN RCVD TLK: CPT | Performed by: INTERNAL MEDICINE

## 2023-10-06 PROCEDURE — 3075F SYST BP GE 130 - 139MM HG: CPT | Performed by: INTERNAL MEDICINE

## 2023-10-06 ASSESSMENT — ENCOUNTER SYMPTOMS
ABDOMINAL PAIN: 0
HEMATEMESIS: 0
HEMATOCHEZIA: 0
WHEEZING: 0
SHORTNESS OF BREATH: 1
COLOR CHANGE: 0
BLURRED VISION: 0
BOWEL INCONTINENCE: 0
DIARRHEA: 0
SPUTUM PRODUCTION: 0
ORTHOPNEA: 0
HOARSE VOICE: 0

## 2023-10-06 NOTE — PROGRESS NOTES
[address] [facility]  [id number] [place] [address]  PHONE: [phone]        10/06/23        NAME:  Jesi Dugan  : 1950  MRN: [id number]       SUBJECTIVE:   Jesi Dugan is a 72 y.o. male seen for a follow up visit regarding the following: The patient has a hx of mild AS vs sclerosis,CAD,primary hypertension,HFpEF, PAF,and SSS with pacemaker. He returns for follow up. He reports recurrent SOB q 1 months that responds to additional Lasix x 3-4 days. Chief Complaint   Patient presents with    Coronary Artery Disease       HPI:    Congestive Heart Failure  Presents for follow-up visit. Associated symptoms include edema and shortness of breath. Pertinent negatives include no abdominal pain, chest pain, chest pressure, claudication, fatigue, muscle weakness, near-syncope, nocturia, orthopnea, palpitations, paroxysmal nocturnal dyspnea or unexpected weight change. The symptoms have been worsening. Past Medical History, Past Surgical History, Family history, Social History, and Medications were all reviewed with the patient today and updated as necessary.          Current Outpatient Medications:     empagliflozin (JARDIANCE) 10 MG tablet, Take 1 tablet by mouth daily, Disp: 30 tablet, Rfl: 5    Cholecalciferol (VITAMIN D) 125 MCG (5000 UT) CAPS, Take by mouth, Disp: , Rfl:     POTASSIUM GLUCONATE PO, Take by mouth, Disp: , Rfl:     losartan (COZAAR) 100 MG tablet, Take 1 tablet by mouth daily, Disp: 90 tablet, Rfl: 3    atorvastatin (LIPITOR) 40 MG tablet, Take 1 tablet by mouth daily, Disp: 90 tablet, Rfl: 3    amLODIPine (NORVASC) 10 MG tablet, Take 1 tablet by mouth daily, Disp: 90 tablet, Rfl: 3    apixaban (ELIQUIS) 5 MG TABS tablet, Take 1 tablet by mouth 2 times daily, Disp: 60 tablet, Rfl: 5    furosemide (LASIX) 40 MG tablet, Take 1 tablet by mouth daily, Disp: 60 tablet, Rfl: 3    escitalopram (LEXAPRO) 10 MG tablet, TAKE 1 TABLET BY MOUTH EVERY DAY, Disp:

## 2023-10-13 ENCOUNTER — NURSE ONLY (OUTPATIENT)
Age: 73
End: 2023-10-13

## 2023-10-13 DIAGNOSIS — I44.2 INTERMITTENT COMPLETE ATRIOVENTRICULAR BLOCK (HCC): Primary | ICD-10-CM

## 2023-11-17 ENCOUNTER — OFFICE VISIT (OUTPATIENT)
Age: 73
End: 2023-11-17
Payer: MEDICARE

## 2023-11-17 VITALS
DIASTOLIC BLOOD PRESSURE: 70 MMHG | HEIGHT: 68 IN | WEIGHT: 219 LBS | SYSTOLIC BLOOD PRESSURE: 122 MMHG | HEART RATE: 80 BPM | BODY MASS INDEX: 33.19 KG/M2

## 2023-11-17 DIAGNOSIS — I35.8 NONRHEUMATIC AORTIC VALVE SCLEROSIS: Primary | ICD-10-CM

## 2023-11-17 DIAGNOSIS — I25.10 ATHEROSCLEROSIS OF NATIVE CORONARY ARTERY OF NATIVE HEART WITHOUT ANGINA PECTORIS: ICD-10-CM

## 2023-11-17 DIAGNOSIS — I50.32 CHRONIC DIASTOLIC CONGESTIVE HEART FAILURE (HCC): ICD-10-CM

## 2023-11-17 DIAGNOSIS — I10 ESSENTIAL HYPERTENSION: ICD-10-CM

## 2023-11-17 DIAGNOSIS — Z95.0 CARDIAC PACEMAKER: ICD-10-CM

## 2023-11-17 DIAGNOSIS — I48.0 PAROXYSMAL ATRIAL FIBRILLATION (HCC): ICD-10-CM

## 2023-11-17 PROCEDURE — 99214 OFFICE O/P EST MOD 30 MIN: CPT | Performed by: INTERNAL MEDICINE

## 2023-11-17 PROCEDURE — 4004F PT TOBACCO SCREEN RCVD TLK: CPT | Performed by: INTERNAL MEDICINE

## 2023-11-17 PROCEDURE — 1123F ACP DISCUSS/DSCN MKR DOCD: CPT | Performed by: INTERNAL MEDICINE

## 2023-11-17 PROCEDURE — 3074F SYST BP LT 130 MM HG: CPT | Performed by: INTERNAL MEDICINE

## 2023-11-17 PROCEDURE — 3078F DIAST BP <80 MM HG: CPT | Performed by: INTERNAL MEDICINE

## 2023-11-17 PROCEDURE — G8484 FLU IMMUNIZE NO ADMIN: HCPCS | Performed by: INTERNAL MEDICINE

## 2023-11-17 PROCEDURE — G8427 DOCREV CUR MEDS BY ELIG CLIN: HCPCS | Performed by: INTERNAL MEDICINE

## 2023-11-17 PROCEDURE — G8417 CALC BMI ABV UP PARAM F/U: HCPCS | Performed by: INTERNAL MEDICINE

## 2023-11-17 PROCEDURE — 3017F COLORECTAL CA SCREEN DOC REV: CPT | Performed by: INTERNAL MEDICINE

## 2023-11-17 RX ORDER — FUROSEMIDE 40 MG/1
80 TABLET ORAL DAILY
Qty: 180 TABLET | Refills: 3 | Status: SHIPPED | OUTPATIENT
Start: 2023-11-17

## 2023-11-17 ASSESSMENT — ENCOUNTER SYMPTOMS
VOMITING: 0
WHEEZING: 0
BOWEL INCONTINENCE: 0
HEMATOCHEZIA: 0
SORE THROAT: 0
HEMOPTYSIS: 0
RHINORRHEA: 0
SWOLLEN GLANDS: 0
ORTHOPNEA: 0
SPUTUM PRODUCTION: 0
ABDOMINAL PAIN: 0
COLOR CHANGE: 0
DIARRHEA: 0
HOARSE VOICE: 0
BLURRED VISION: 0
HEMATEMESIS: 0
SHORTNESS OF BREATH: 1

## 2023-11-17 NOTE — PROGRESS NOTES
[facility] CARDIOLOGY  [id number] [place] [address]  PHONE: [phone]        23        NAME:  Alan Saenz  : 1950  MRN: [id number]       SUBJECTIVE:   Alan Saenz is a 72 y.o. male seen for a follow up visit regarding the following: The patient has a hx of SSS with pacemaker,HFpEF,mild AS vs sclerosis. Last month,he reported recurrent dyspnea. Lasix was increased and Jardiance was added. He returns for follow up after medication change. Overall,he reports feeling better. Chief Complaint   Patient presents with    Hypertension    Coronary Artery Disease       HPI:    Shortness of Breath  This is a recurrent problem. The problem has been resolved. Associated symptoms include leg swelling (improved. ). Pertinent negatives include no abdominal pain, chest pain, claudication, coryza, ear pain, fever, headaches, hemoptysis, leg pain, neck pain, orthopnea, PND, rash, rhinorrhea, sore throat, sputum production, swollen glands, syncope, vomiting or wheezing. Past Medical History, Past Surgical History, Family history, Social History, and Medications were all reviewed with the patient today and updated as necessary.          Current Outpatient Medications:     furosemide (LASIX) 40 MG tablet, Take 2 tablets by mouth daily Take 80 mg on Tuesday and Friday, take 40 mg the rest of the week, Disp: 180 tablet, Rfl: 3    empagliflozin (JARDIANCE) 10 MG tablet, Take 1 tablet by mouth daily, Disp: 30 tablet, Rfl: 5    Cholecalciferol (VITAMIN D) 125 MCG (5000 UT) CAPS, Take by mouth, Disp: , Rfl:     POTASSIUM GLUCONATE PO, Take by mouth, Disp: , Rfl:     losartan (COZAAR) 100 MG tablet, Take 1 tablet by mouth daily, Disp: 90 tablet, Rfl: 3    atorvastatin (LIPITOR) 40 MG tablet, Take 1 tablet by mouth daily, Disp: 90 tablet, Rfl: 3    amLODIPine (NORVASC) 10 MG tablet, Take 1 tablet by mouth daily, Disp: 90 tablet, Rfl: 3    apixaban (ELIQUIS) 5 MG TABS tablet, Take 1

## 2023-12-04 ENCOUNTER — OFFICE VISIT (OUTPATIENT)
Dept: FAMILY MEDICINE CLINIC | Facility: CLINIC | Age: 73
End: 2023-12-04
Payer: MEDICARE

## 2023-12-04 VITALS
TEMPERATURE: 98.1 F | BODY MASS INDEX: 33.49 KG/M2 | SYSTOLIC BLOOD PRESSURE: 135 MMHG | DIASTOLIC BLOOD PRESSURE: 67 MMHG | HEIGHT: 68 IN | RESPIRATION RATE: 16 BRPM | OXYGEN SATURATION: 98 % | HEART RATE: 63 BPM | WEIGHT: 221 LBS

## 2023-12-04 DIAGNOSIS — D68.69 ACQUIRED HYPERCOAGULABLE STATE (HCC): ICD-10-CM

## 2023-12-04 DIAGNOSIS — I50.32 CHRONIC DIASTOLIC CONGESTIVE HEART FAILURE (HCC): ICD-10-CM

## 2023-12-04 DIAGNOSIS — I48.0 PAROXYSMAL ATRIAL FIBRILLATION (HCC): ICD-10-CM

## 2023-12-04 DIAGNOSIS — I35.8 NONRHEUMATIC AORTIC VALVE SCLEROSIS: ICD-10-CM

## 2023-12-04 DIAGNOSIS — R60.0 LOCALIZED EDEMA: ICD-10-CM

## 2023-12-04 DIAGNOSIS — F41.8 SITUATIONAL ANXIETY: ICD-10-CM

## 2023-12-04 DIAGNOSIS — F43.9 STRESS: ICD-10-CM

## 2023-12-04 DIAGNOSIS — D64.89 HYPERCHROMIC ANEMIA: ICD-10-CM

## 2023-12-04 DIAGNOSIS — Z23 NEED FOR PROPHYLACTIC VACCINATION AND INOCULATION AGAINST VARICELLA: ICD-10-CM

## 2023-12-04 DIAGNOSIS — R35.1 NOCTURIA: ICD-10-CM

## 2023-12-04 DIAGNOSIS — Z00.00 MEDICARE ANNUAL WELLNESS VISIT, SUBSEQUENT: Primary | ICD-10-CM

## 2023-12-04 DIAGNOSIS — I25.10 ATHEROSCLEROSIS OF NATIVE CORONARY ARTERY OF NATIVE HEART WITHOUT ANGINA PECTORIS: ICD-10-CM

## 2023-12-04 DIAGNOSIS — Z95.0 CARDIAC PACEMAKER: ICD-10-CM

## 2023-12-04 DIAGNOSIS — Z29.11 NEED FOR RSV VACCINATION: ICD-10-CM

## 2023-12-04 DIAGNOSIS — I10 ESSENTIAL HYPERTENSION: ICD-10-CM

## 2023-12-04 DIAGNOSIS — E66.09 CLASS 1 OBESITY DUE TO EXCESS CALORIES WITH SERIOUS COMORBIDITY AND BODY MASS INDEX (BMI) OF 33.0 TO 33.9 IN ADULT: ICD-10-CM

## 2023-12-04 DIAGNOSIS — E78.00 PURE HYPERCHOLESTEROLEMIA: ICD-10-CM

## 2023-12-04 LAB
ALBUMIN SERPL-MCNC: 4.2 G/DL (ref 3.2–4.6)
ALBUMIN/GLOB SERPL: 1.4 (ref 0.4–1.6)
ALP SERPL-CCNC: 97 U/L (ref 50–136)
ALT SERPL-CCNC: 29 U/L (ref 12–65)
ANION GAP SERPL CALC-SCNC: 5 MMOL/L (ref 2–11)
AST SERPL-CCNC: 26 U/L (ref 15–37)
BASOPHILS # BLD: 0 K/UL (ref 0–0.2)
BASOPHILS NFR BLD: 1 % (ref 0–2)
BILIRUB SERPL-MCNC: 0.4 MG/DL (ref 0.2–1.1)
BUN SERPL-MCNC: 23 MG/DL (ref 8–23)
CALCIUM SERPL-MCNC: 9.2 MG/DL (ref 8.3–10.4)
CHLORIDE SERPL-SCNC: 108 MMOL/L (ref 101–110)
CHOLEST SERPL-MCNC: 163 MG/DL
CO2 SERPL-SCNC: 28 MMOL/L (ref 21–32)
CREAT SERPL-MCNC: 1.2 MG/DL (ref 0.8–1.5)
DIFFERENTIAL METHOD BLD: ABNORMAL
EOSINOPHIL # BLD: 0.2 K/UL (ref 0–0.8)
EOSINOPHIL NFR BLD: 4 % (ref 0.5–7.8)
ERYTHROCYTE [DISTWIDTH] IN BLOOD BY AUTOMATED COUNT: 14.6 % (ref 11.9–14.6)
GLOBULIN SER CALC-MCNC: 2.9 G/DL (ref 2.8–4.5)
GLUCOSE SERPL-MCNC: 93 MG/DL (ref 65–100)
HCT VFR BLD AUTO: 37.8 % (ref 41.1–50.3)
HDLC SERPL-MCNC: 84 MG/DL (ref 40–60)
HDLC SERPL: 1.9
HGB BLD-MCNC: 12.4 G/DL (ref 13.6–17.2)
IMM GRANULOCYTES # BLD AUTO: 0 K/UL (ref 0–0.5)
IMM GRANULOCYTES NFR BLD AUTO: 0 % (ref 0–5)
LDLC SERPL CALC-MCNC: 71.4 MG/DL
LYMPHOCYTES # BLD: 1.1 K/UL (ref 0.5–4.6)
LYMPHOCYTES NFR BLD: 26 % (ref 13–44)
MCH RBC QN AUTO: 31.3 PG (ref 26.1–32.9)
MCHC RBC AUTO-ENTMCNC: 32.8 G/DL (ref 31.4–35)
MCV RBC AUTO: 95.5 FL (ref 82–102)
MONOCYTES # BLD: 0.5 K/UL (ref 0.1–1.3)
MONOCYTES NFR BLD: 12 % (ref 4–12)
NEUTS SEG # BLD: 2.6 K/UL (ref 1.7–8.2)
NEUTS SEG NFR BLD: 57 % (ref 43–78)
NRBC # BLD: 0 K/UL (ref 0–0.2)
PLATELET # BLD AUTO: 186 K/UL (ref 150–450)
PMV BLD AUTO: 10.1 FL (ref 9.4–12.3)
POTASSIUM SERPL-SCNC: 4.3 MMOL/L (ref 3.5–5.1)
PROT SERPL-MCNC: 7.1 G/DL (ref 6.3–8.2)
PSA SERPL-MCNC: 0.4 NG/ML
RBC # BLD AUTO: 3.96 M/UL (ref 4.23–5.6)
SODIUM SERPL-SCNC: 141 MMOL/L (ref 133–143)
TRIGL SERPL-MCNC: 38 MG/DL (ref 35–150)
VIT B12 SERPL-MCNC: 297 PG/ML (ref 193–986)
VLDLC SERPL CALC-MCNC: 7.6 MG/DL (ref 6–23)
WBC # BLD AUTO: 4.5 K/UL (ref 4.3–11.1)

## 2023-12-04 PROCEDURE — G8484 FLU IMMUNIZE NO ADMIN: HCPCS | Performed by: FAMILY MEDICINE

## 2023-12-04 PROCEDURE — 3078F DIAST BP <80 MM HG: CPT | Performed by: FAMILY MEDICINE

## 2023-12-04 PROCEDURE — 99214 OFFICE O/P EST MOD 30 MIN: CPT | Performed by: FAMILY MEDICINE

## 2023-12-04 PROCEDURE — G8427 DOCREV CUR MEDS BY ELIG CLIN: HCPCS | Performed by: FAMILY MEDICINE

## 2023-12-04 PROCEDURE — 3075F SYST BP GE 130 - 139MM HG: CPT | Performed by: FAMILY MEDICINE

## 2023-12-04 PROCEDURE — 1123F ACP DISCUSS/DSCN MKR DOCD: CPT | Performed by: FAMILY MEDICINE

## 2023-12-04 PROCEDURE — 4004F PT TOBACCO SCREEN RCVD TLK: CPT | Performed by: FAMILY MEDICINE

## 2023-12-04 PROCEDURE — G8417 CALC BMI ABV UP PARAM F/U: HCPCS | Performed by: FAMILY MEDICINE

## 2023-12-04 PROCEDURE — 3017F COLORECTAL CA SCREEN DOC REV: CPT | Performed by: FAMILY MEDICINE

## 2023-12-04 PROCEDURE — G0439 PPPS, SUBSEQ VISIT: HCPCS | Performed by: FAMILY MEDICINE

## 2023-12-04 RX ORDER — ESCITALOPRAM OXALATE 10 MG/1
10 TABLET ORAL DAILY
Qty: 90 TABLET | Refills: 3 | Status: SHIPPED | OUTPATIENT
Start: 2023-12-04

## 2023-12-04 ASSESSMENT — PATIENT HEALTH QUESTIONNAIRE - PHQ9
2. FEELING DOWN, DEPRESSED OR HOPELESS: 0
1. LITTLE INTEREST OR PLEASURE IN DOING THINGS: 0
SUM OF ALL RESPONSES TO PHQ QUESTIONS 1-9: 0
SUM OF ALL RESPONSES TO PHQ9 QUESTIONS 1 & 2: 0
SUM OF ALL RESPONSES TO PHQ QUESTIONS 1-9: 0

## 2023-12-04 ASSESSMENT — LIFESTYLE VARIABLES
HOW MANY STANDARD DRINKS CONTAINING ALCOHOL DO YOU HAVE ON A TYPICAL DAY: 1 OR 2
HOW OFTEN DO YOU HAVE A DRINK CONTAINING ALCOHOL: 2-4 TIMES A MONTH

## 2023-12-04 NOTE — PROGRESS NOTES
escitalopram (LEXAPRO) 10 MG tablet      9. Stress  escitalopram (LEXAPRO) 10 MG tablet      10. Nocturia  PSA, Diagnostic      11. Paroxysmal atrial fibrillation (HCC)        12. Acquired hypercoagulable state ([address])        13. Atherosclerosis of native coronary artery of native heart without angina pectoris        14. Nonrheumatic aortic valve sclerosis        15. Chronic diastolic congestive heart failure (HCC)        16. Cardiac pacemaker        17. Class 1 obesity due to excess calories with serious comorbidity and body mass index (BMI) of 33.0 to 33.9 in adult          Await fasting labs as above. Particularly interested in renal function with furosemide having been increased recently to where he is taking 40 mg daily and 80 mg twice weekly. Uncertain as to how much potassium patient is taking with this having been prescribed by another provider and not seen in chart. Encouraged patient to check blood pressure more regularly. Encouraged 150 minutes of low impact aerobic activity weekly. Also encouraged resistance training every other day with 24-48 hours of muscle glucose uptake subsequently. Follow-up as planned with Dr. Randi Flynn in the near future with plans for echocardiogram.  Refilled patient's Lexapro. Plan reassessment here in 6 months nonfasting.   Spent 32 minutes with patient today in examination, evaluation, and documentation in addition to wellness exam.    Provided educational information on the following:        Discharge Meds:  Current Outpatient Medications   Medication Sig Dispense Refill    escitalopram (LEXAPRO) 10 MG tablet Take 1 tablet by mouth daily 90 tablet 3    zoster recombinant adjuvanted vaccine (SHINGRIX) 50 MCG/0.5ML SUSR injection Inject 0.5 mLs into the muscle once for 1 dose 0.5 mL 0    respiratory syncytial vaccine, adjuvanted (AREXVY) 120 MCG/0.5ML injection Inject 0.5 mLs into the muscle once for 1 dose 0.5 mL 0    furosemide (LASIX) 40 MG tablet Take 2 tablets by mouth

## 2023-12-05 ENCOUNTER — CLINICAL DOCUMENTATION (OUTPATIENT)
Dept: SPIRITUAL SERVICES | Age: 73
End: 2023-12-05

## 2023-12-05 NOTE — RESULT ENCOUNTER NOTE
Call back lipid panel excellent with HDL, good cholesterol, now 84. Hemoglobin and hematocrit have improved to 12.4 and 37.8 with red blood cells improved to 3.96. Vitamin B level okay at 297. Electrolytes, kidney and liver functions appropriate. PSA level normal at 0. 4.

## 2023-12-05 NOTE — PROGRESS NOTES
Advance Care Planning   Ambulatory ACP Specialist Patient Outreach    Date:  12/5/2023    ACP Specialist:  [address] [facility] [employer] call to patient in follow-up to ACP Specialist referral from:Shaneka Tom MD    [x] PCP  [] Provider   [] Ambulatory Care Management [] Other     For:                  [x] Advance Directive Assistance              [] Complete Portable DNR order              [] Complete POST/POLST/MOST              [] Code Status Discussion             [] Discuss Goals of Care             [] Early ACP Decision-Making              [] Other (Specify)    Date Referral Received:12/4/23    Next Step:   [] ACP scheduled conversation  [] Outreach again in one week               [] Email / Mail [address]  [] Email / Mail Advance Directive   [x] Closing referral.  Routing closure to referring provider/staff and to ACP Specialist . [x] Closure letter mailed to patient with invitation to contact ACP Specialist if / when ready. [] Other (Specify here):         [x] At this time, Healthcare Decision Maker Is: Advance Care Planning   Healthcare Decision Maker:    Primary Decision Maker: Adriane Schmitt - [phone]    Secondary Decision Maker: Alex Mcgovern - [phone]        [] Primary agent named in scanned advance directive. [x] Legal Next of Kin. [] Unable to determine legal decision maker at this time. Outreaches:         [x] 1st -  Date:  12/5/23               Intervention:  [x] Spoke with Patient   [] Left Voice mail [] Email / Mail    [] [place]  [] Other [phone]) : Outcomes: Outreach phone call to the patient utilizing work phone number. Spoke with patient requesting to move forward with scheduling an appointment mid January 2024. Patient has a [employer] business and very busy during this time of the year. Family will be vacationing after the holidays.   Advised patient to call back when ready to move forward with scheduling an ACP

## 2024-01-09 PROCEDURE — 93296 REM INTERROG EVL PM/IDS: CPT | Performed by: INTERNAL MEDICINE

## 2024-01-09 PROCEDURE — 93294 REM INTERROG EVL PM/LDLS PM: CPT | Performed by: INTERNAL MEDICINE

## 2024-04-18 DIAGNOSIS — I50.32 CHRONIC DIASTOLIC CONGESTIVE HEART FAILURE (HCC): ICD-10-CM

## 2024-04-18 NOTE — TELEPHONE ENCOUNTER
MEDICATION REFILL REQUEST      Name of Medication:  empagliflozin (JARDIANCE) 10 MG tablet   Dose:    Frequency:    Quantity:    Days' supply:  30      Pharmacy Name/Location:  On File    Please call and advise if need be.

## 2024-04-22 ENCOUNTER — TELEPHONE (OUTPATIENT)
Age: 74
End: 2024-04-22

## 2024-04-22 NOTE — TELEPHONE ENCOUNTER
Pharmacy is out of medication Jardiance, they gave him a couple days supply but do we have samples that can hold them over?

## 2024-04-23 NOTE — TELEPHONE ENCOUNTER
Pharmacy has called and said his jardiance is in so she dont need the samples Said to tell you thanks

## 2024-05-22 ENCOUNTER — OFFICE VISIT (OUTPATIENT)
Age: 74
End: 2024-05-22
Payer: MEDICARE

## 2024-05-22 VITALS
BODY MASS INDEX: 34.56 KG/M2 | DIASTOLIC BLOOD PRESSURE: 70 MMHG | WEIGHT: 228 LBS | HEIGHT: 68 IN | SYSTOLIC BLOOD PRESSURE: 122 MMHG | HEART RATE: 68 BPM

## 2024-05-22 DIAGNOSIS — Z95.0 CARDIAC PACEMAKER: ICD-10-CM

## 2024-05-22 DIAGNOSIS — I48.0 PAROXYSMAL ATRIAL FIBRILLATION (HCC): ICD-10-CM

## 2024-05-22 DIAGNOSIS — I10 ESSENTIAL HYPERTENSION: Primary | ICD-10-CM

## 2024-05-22 PROCEDURE — 99214 OFFICE O/P EST MOD 30 MIN: CPT | Performed by: INTERNAL MEDICINE

## 2024-05-22 PROCEDURE — G8417 CALC BMI ABV UP PARAM F/U: HCPCS | Performed by: INTERNAL MEDICINE

## 2024-05-22 PROCEDURE — G8427 DOCREV CUR MEDS BY ELIG CLIN: HCPCS | Performed by: INTERNAL MEDICINE

## 2024-05-22 PROCEDURE — 3017F COLORECTAL CA SCREEN DOC REV: CPT | Performed by: INTERNAL MEDICINE

## 2024-05-22 PROCEDURE — 3078F DIAST BP <80 MM HG: CPT | Performed by: INTERNAL MEDICINE

## 2024-05-22 PROCEDURE — 3074F SYST BP LT 130 MM HG: CPT | Performed by: INTERNAL MEDICINE

## 2024-05-22 PROCEDURE — 1123F ACP DISCUSS/DSCN MKR DOCD: CPT | Performed by: INTERNAL MEDICINE

## 2024-05-22 PROCEDURE — 4004F PT TOBACCO SCREEN RCVD TLK: CPT | Performed by: INTERNAL MEDICINE

## 2024-05-22 ASSESSMENT — ENCOUNTER SYMPTOMS
HEMATEMESIS: 0
DIARRHEA: 0
BOWEL INCONTINENCE: 0
HEMATOCHEZIA: 0
COLOR CHANGE: 0
HOARSE VOICE: 0
WHEEZING: 0
SHORTNESS OF BREATH: 0
ABDOMINAL PAIN: 0
SPUTUM PRODUCTION: 0

## 2024-05-22 NOTE — PROGRESS NOTES
[facility] CARDIOLOGY  [address] [place] [address], [facility] [address]  [place] [address]  PHONE: [phone]        24        NAME:  Anjana England  : 1950  MRN: [id number]       SUBJECTIVE:   Anjana England is a 74 y.o. male seen for a follow up visit regarding the following: Mild AS,SSS with PPM, PAF,and HFpEF.Recent follow up echo reported LV EF 50-55%,aortic sclerosis ( mAVG=11),mild AR,moderate TR ( RVSP=39),and mild MR.He returns for scheduled follow up.He reports doing well.I discussed recent echo findings with the patient and his wife.    Chief Complaint   Patient presents with    Atrial Fibrillation    Results     echo       HPI:    Atrial Fibrillation  Presents for follow-up visit. Symptoms are negative for an AICD problem, bradycardia, chest pain, dizziness, hemodynamic instability, hypertension, hypotension, pacemaker problem, palpitations, shortness of breath, syncope, tachycardia and weakness. The symptoms have been stable.       Past Medical History, Past Surgical History, Family history, Social History, and Medications were all reviewed with the patient today and updated as necessary.         Current Outpatient Medications:     empagliflozin (JARDIANCE) 10 MG tablet, Take 1 tablet by mouth daily, Disp: 90 tablet, Rfl: 3    apixaban (ELIQUIS) 5 MG TABS tablet, Take 1 tablet by mouth 2 times daily, Disp: 180 tablet, Rfl: 3    escitalopram (LEXAPRO) 10 MG tablet, Take 1 tablet by mouth daily, Disp: 90 tablet, Rfl: 3    furosemide (LASIX) 40 MG tablet, Take 2 tablets by mouth daily Take 80 mg on Tuesday and Friday, take 40 mg the rest of the week (Patient taking differently: Take 80 mg on Tuesday and Friday, take 40 mg the rest of the week), Disp: 180 tablet, Rfl: 3    Cholecalciferol (VITAMIN D) 125 MCG (5000 UT) CAPS, Take by mouth, Disp: , Rfl:     POTASSIUM GLUCONATE PO, Take by mouth, Disp: , Rfl:     losartan (COZAAR) 100 MG tablet, Take 1 tablet by mouth daily, Disp: 90 tablet, Rfl:

## 2024-07-11 ENCOUNTER — PATIENT MESSAGE (OUTPATIENT)
Age: 74
End: 2024-07-11

## 2024-07-11 DIAGNOSIS — I10 ESSENTIAL HYPERTENSION: ICD-10-CM

## 2024-07-12 NOTE — TELEPHONE ENCOUNTER
From: Anjana England  To: Dr. Dwaine Pinto  Sent: 7/11/2024 5:39 PM EDT  Subject: Refill    I need to have Dr. Pinto do a refill on my medication   LOSARTAN POTASSIUM 100 MG TABLET  Anjana England   1950  [phone]  [facility]   [phone]

## 2024-07-15 RX ORDER — LOSARTAN POTASSIUM 100 MG/1
100 TABLET ORAL DAILY
Qty: 90 TABLET | Refills: 3 | Status: SHIPPED | OUTPATIENT
Start: 2024-07-15

## 2024-08-21 DIAGNOSIS — E78.00 PURE HYPERCHOLESTEROLEMIA: ICD-10-CM

## 2024-08-21 RX ORDER — ATORVASTATIN CALCIUM 40 MG/1
40 TABLET, FILM COATED ORAL DAILY
Qty: 90 TABLET | Refills: 3 | Status: SHIPPED | OUTPATIENT
Start: 2024-08-21

## 2024-08-21 NOTE — TELEPHONE ENCOUNTER
Verified rx in last med list 5-22-24. Pharmacy confirmed. Erx as requested He does have an appt to see you 11-15-24

## 2024-09-13 DIAGNOSIS — I10 ESSENTIAL HYPERTENSION: ICD-10-CM

## 2024-09-13 RX ORDER — AMLODIPINE BESYLATE 10 MG/1
10 TABLET ORAL DAILY
Qty: 90 TABLET | Refills: 3 | Status: SHIPPED | OUTPATIENT
Start: 2024-09-13

## 2024-11-14 ENCOUNTER — NURSE ONLY (OUTPATIENT)
Age: 74
End: 2024-11-14

## 2024-11-14 DIAGNOSIS — I49.5 TACHY-BRADY SYNDROME (HCC): Primary | ICD-10-CM

## 2024-11-15 ENCOUNTER — OFFICE VISIT (OUTPATIENT)
Age: 74
End: 2024-11-15

## 2024-11-15 VITALS
SYSTOLIC BLOOD PRESSURE: 128 MMHG | HEART RATE: 84 BPM | DIASTOLIC BLOOD PRESSURE: 86 MMHG | BODY MASS INDEX: 34.25 KG/M2 | WEIGHT: 226 LBS | HEIGHT: 68 IN

## 2024-11-15 DIAGNOSIS — I10 ESSENTIAL HYPERTENSION: ICD-10-CM

## 2024-11-15 DIAGNOSIS — I50.32 CHRONIC DIASTOLIC CONGESTIVE HEART FAILURE (HCC): ICD-10-CM

## 2024-11-15 DIAGNOSIS — I35.8 NONRHEUMATIC AORTIC VALVE SCLEROSIS: ICD-10-CM

## 2024-11-15 DIAGNOSIS — I48.21 PERMANENT ATRIAL FIBRILLATION (HCC): Primary | ICD-10-CM

## 2024-11-15 DIAGNOSIS — Z95.0 CARDIAC PACEMAKER: ICD-10-CM

## 2024-11-15 ASSESSMENT — ENCOUNTER SYMPTOMS
HOARSE VOICE: 0
SHORTNESS OF BREATH: 0
BLURRED VISION: 0
DIARRHEA: 0
ABDOMINAL PAIN: 0
COLOR CHANGE: 0
HEMATOCHEZIA: 0
BOWEL INCONTINENCE: 0
HEMATEMESIS: 0
ORTHOPNEA: 0
SPUTUM PRODUCTION: 0
WHEEZING: 0

## 2024-11-15 NOTE — PROGRESS NOTES
[facility] CARDIOLOGY  [address] [place] [address], [facility] [address]  [place] [address]  PHONE: [phone]        11/15/24        NAME:  Anjana England  : 1950  MRN: [id number]       SUBJECTIVE:   Anjana England is a 74 y.o. male seen for a follow up visit regarding the following: The patient has SSS with pacemaker,PAF,HFpEF,and aortic sclerosis vs mild AS ( mAVG=11 with peak aortic velocity of 2.1 m/s per echo in 2024).He returns for scheduled follow up.    Chief Complaint   Patient presents with    Coronary Artery Disease       HPI:    Atrial Fibrillation  Presents for follow-up visit. Symptoms are negative for an AICD problem, bradycardia, chest pain, dizziness, hemodynamic instability, hypertension, hypotension, pacemaker problem, palpitations, shortness of breath, syncope, tachycardia and weakness. The symptoms have been stable.       Past Medical History, Past Surgical History, Family history, Social History, and Medications were all reviewed with the patient today and updated as necessary.         Current Outpatient Medications:     amLODIPine (NORVASC) 10 MG tablet, Take 1 tablet by mouth daily, Disp: 90 tablet, Rfl: 3    apixaban (ELIQUIS) 5 MG TABS tablet, Take 1 tablet by mouth 2 times daily, Disp: 180 tablet, Rfl: 3    atorvastatin (LIPITOR) 40 MG tablet, Take 1 tablet by mouth daily, Disp: 90 tablet, Rfl: 3    losartan (COZAAR) 100 MG tablet, Take 1 tablet by mouth daily, Disp: 90 tablet, Rfl: 3    empagliflozin (JARDIANCE) 10 MG tablet, Take 1 tablet by mouth daily, Disp: 90 tablet, Rfl: 3    escitalopram (LEXAPRO) 10 MG tablet, Take 1 tablet by mouth daily, Disp: 90 tablet, Rfl: 3    furosemide (LASIX) 40 MG tablet, Take 2 tablets by mouth daily Take 80 mg on Tuesday and Friday, take 40 mg the rest of the week (Patient taking differently: Take 80 mg on Tuesday and Friday, take 40 mg the rest of the week), Disp: 180 tablet, Rfl: 3    Cholecalciferol (VITAMIN D) 125 MCG (5000 UT) CAPS, Take

## 2024-12-06 DIAGNOSIS — F43.9 STRESS: ICD-10-CM

## 2024-12-06 DIAGNOSIS — F41.8 SITUATIONAL ANXIETY: ICD-10-CM

## 2024-12-06 RX ORDER — ESCITALOPRAM OXALATE 10 MG/1
10 TABLET ORAL DAILY
Qty: 90 TABLET | Refills: 1 | Status: SHIPPED | OUTPATIENT
Start: 2024-12-06

## 2025-02-10 DIAGNOSIS — I50.32 CHRONIC DIASTOLIC CONGESTIVE HEART FAILURE (HCC): ICD-10-CM

## 2025-02-10 NOTE — TELEPHONE ENCOUNTER
MEDICATION REFILL REQUEST      Name of Medication:  furosemide  Dose:  40 mg  Frequency:  2 tablets daily (80 mg) on Tuesdays and Fridays - rest of the week take 1 tablet daily  Quantity:    Days' supply:        Pharmacy Name/Location:  [facility]

## 2025-02-11 RX ORDER — FUROSEMIDE 40 MG/1
TABLET ORAL
Qty: 180 TABLET | Refills: 3 | Status: SHIPPED | OUTPATIENT
Start: 2025-02-11

## 2025-03-10 SDOH — HEALTH STABILITY: PHYSICAL HEALTH: ON AVERAGE, HOW MANY DAYS PER WEEK DO YOU ENGAGE IN MODERATE TO STRENUOUS EXERCISE (LIKE A BRISK WALK)?: 0 DAYS

## 2025-03-10 SDOH — HEALTH STABILITY: PHYSICAL HEALTH: ON AVERAGE, HOW MANY MINUTES DO YOU ENGAGE IN EXERCISE AT THIS LEVEL?: 0 MIN

## 2025-03-10 ASSESSMENT — PATIENT HEALTH QUESTIONNAIRE - PHQ9
1. LITTLE INTEREST OR PLEASURE IN DOING THINGS: NOT AT ALL
SUM OF ALL RESPONSES TO PHQ QUESTIONS 1-9: 0
2. FEELING DOWN, DEPRESSED OR HOPELESS: NOT AT ALL
SUM OF ALL RESPONSES TO PHQ QUESTIONS 1-9: 0

## 2025-03-10 ASSESSMENT — LIFESTYLE VARIABLES
HOW OFTEN DO YOU HAVE A DRINK CONTAINING ALCOHOL: 4
HOW OFTEN DO YOU HAVE SIX OR MORE DRINKS ON ONE OCCASION: 1
HOW OFTEN DO YOU HAVE A DRINK CONTAINING ALCOHOL: 2-3 TIMES A WEEK
HOW MANY STANDARD DRINKS CONTAINING ALCOHOL DO YOU HAVE ON A TYPICAL DAY: 1 OR 2
HOW MANY STANDARD DRINKS CONTAINING ALCOHOL DO YOU HAVE ON A TYPICAL DAY: 1

## 2025-03-13 ENCOUNTER — OFFICE VISIT (OUTPATIENT)
Dept: FAMILY MEDICINE CLINIC | Facility: CLINIC | Age: 75
End: 2025-03-13

## 2025-03-13 VITALS
HEART RATE: 63 BPM | DIASTOLIC BLOOD PRESSURE: 61 MMHG | SYSTOLIC BLOOD PRESSURE: 105 MMHG | TEMPERATURE: 98 F | HEIGHT: 68 IN | OXYGEN SATURATION: 97 % | BODY MASS INDEX: 34.4 KG/M2 | WEIGHT: 227 LBS | RESPIRATION RATE: 16 BRPM

## 2025-03-13 DIAGNOSIS — R35.1 NOCTURIA: ICD-10-CM

## 2025-03-13 DIAGNOSIS — I10 ESSENTIAL HYPERTENSION: ICD-10-CM

## 2025-03-13 DIAGNOSIS — I35.8 NONRHEUMATIC AORTIC VALVE SCLEROSIS: ICD-10-CM

## 2025-03-13 DIAGNOSIS — I50.32 CHRONIC DIASTOLIC CONGESTIVE HEART FAILURE (HCC): ICD-10-CM

## 2025-03-13 DIAGNOSIS — Z23 NEED FOR PROPHYLACTIC VACCINATION AGAINST DIPHTHERIA-TETANUS-PERTUSSIS (DTP): ICD-10-CM

## 2025-03-13 DIAGNOSIS — Z00.00 MEDICARE ANNUAL WELLNESS VISIT, SUBSEQUENT: Primary | ICD-10-CM

## 2025-03-13 DIAGNOSIS — E66.09 CLASS 1 OBESITY DUE TO EXCESS CALORIES WITH SERIOUS COMORBIDITY AND BODY MASS INDEX (BMI) OF 34.0 TO 34.9 IN ADULT: ICD-10-CM

## 2025-03-13 DIAGNOSIS — F41.8 SITUATIONAL ANXIETY: ICD-10-CM

## 2025-03-13 DIAGNOSIS — F43.9 STRESS: ICD-10-CM

## 2025-03-13 DIAGNOSIS — E66.811 CLASS 1 OBESITY DUE TO EXCESS CALORIES WITH SERIOUS COMORBIDITY AND BODY MASS INDEX (BMI) OF 34.0 TO 34.9 IN ADULT: ICD-10-CM

## 2025-03-13 DIAGNOSIS — I25.10 ATHEROSCLEROSIS OF NATIVE CORONARY ARTERY OF NATIVE HEART WITHOUT ANGINA PECTORIS: ICD-10-CM

## 2025-03-13 DIAGNOSIS — Z86.0100 HISTORY OF COLON POLYPS: ICD-10-CM

## 2025-03-13 DIAGNOSIS — I48.21 PERMANENT ATRIAL FIBRILLATION (HCC): ICD-10-CM

## 2025-03-13 DIAGNOSIS — E78.00 PURE HYPERCHOLESTEROLEMIA: ICD-10-CM

## 2025-03-13 RX ORDER — LOSARTAN POTASSIUM 100 MG/1
100 TABLET ORAL DAILY
Qty: 90 TABLET | Refills: 2 | Status: SHIPPED | OUTPATIENT
Start: 2025-03-13

## 2025-03-13 RX ORDER — ATORVASTATIN CALCIUM 40 MG/1
40 TABLET, FILM COATED ORAL DAILY
Qty: 90 TABLET | Refills: 2 | Status: SHIPPED | OUTPATIENT
Start: 2025-03-13

## 2025-03-13 RX ORDER — ESCITALOPRAM OXALATE 10 MG/1
10 TABLET ORAL DAILY
Qty: 90 TABLET | Refills: 3 | Status: SHIPPED | OUTPATIENT
Start: 2025-03-13

## 2025-03-13 SDOH — ECONOMIC STABILITY: FOOD INSECURITY: WITHIN THE PAST 12 MONTHS, THE FOOD YOU BOUGHT JUST DIDN'T LAST AND YOU DIDN'T HAVE MONEY TO GET MORE.: NEVER TRUE

## 2025-03-13 SDOH — ECONOMIC STABILITY: FOOD INSECURITY: WITHIN THE PAST 12 MONTHS, YOU WORRIED THAT YOUR FOOD WOULD RUN OUT BEFORE YOU GOT MONEY TO BUY MORE.: NEVER TRUE

## 2025-03-13 NOTE — PATIENT INSTRUCTIONS
chance of having heart disease. A heart-healthy lifestyle can help keep your heart healthy and prevent heart disease. This lifestyle includes eating healthy, being active, staying at a weight that's healthy for you, and not smoking or using tobacco. It also includes taking medicines as directed, managing other health conditions, and trying to get a healthy amount of sleep.  Follow-up care is a key part of your treatment and safety. Be sure to make and go to all appointments, and call your doctor if you are having problems. It's also a good idea to know your test results and keep a list of the medicines you take.  How can you care for yourself at home?  Diet    Use less salt when you cook and eat. This helps lower your blood pressure. Taste food before salting. Add only a little salt when you think you need it. With time, your taste buds will adjust to less salt.     Eat fewer snack items, fast foods, canned soups, and other high-salt, high-fat, processed foods.     Read food labels and try to avoid saturated and trans fats. They increase your risk of heart disease by raising cholesterol levels.     Limit the amount of solid fat--butter, margarine, and shortening--you eat. Use olive, peanut, or canola oil when you cook. Bake, broil, and steam foods instead of frying them.     Eat a variety of fruit and vegetables every day. Dark green, deep orange, red, or yellow fruits and vegetables are especially good for you. Examples include spinach, carrots, peaches, and berries.     Foods high in fiber can reduce your cholesterol and provide important vitamins and minerals. High-fiber foods include whole-grain cereals and breads, oatmeal, beans, brown rice, citrus fruits, and apples.     Eat lean proteins. Heart-healthy proteins include seafood, lean meats and poultry, eggs, beans, peas, nuts, seeds, and soy products.     Limit drinks and foods with added sugar. These include candy, desserts, and soda pop.   Heart-healthy

## 2025-03-13 NOTE — PROGRESS NOTES
Medicare Annual Wellness Visit    Anjana England is here for Medicare AWV (Sub non fasting)    Assessment & Plan     Discussed with patient in detail Medicare subsequent annual wellness exam.  Wellness/anticipatory guidance information provided.  Information on advanced directives discussed and printed.  Tdap vaccine potential benefit discussed with prescription(s) printed; should discuss with pharmacy potential cost(s).  Patient was referred for ACP assistance.  Patient to follow-up with [facility] for eye care later this year and have information sent to our office.  Patient will be due for colonoscopy in 2026.  No concerning hearing problems.       Return in 1 year (on 3/16/2026) for fasting and subwellness.           Patient's complete Health Risk Assessment and screening values have been reviewed and are found in Flowsheets. The following problems were reviewed today and where indicated follow up appointments were made and/or referrals ordered.    Positive Risk Factor Screenings with Interventions:              Inactivity:  On average, how many days per week do you engage in moderate to strenuous exercise (like a brisk walk)?: (Patient-Rptd) 0 days (!) Abnormal  On average, how many minutes do you engage in exercise at this level?: (Patient-Rptd) 0 min  Interventions:  See AVS for additional education material     Abnormal BMI (obese):  Body mass index is 34.52 kg/m². (!) Abnormal  Interventions:  See AVS for additional education material        Dentist Screen:  Have you seen the dentist within the past year?: (!) (Patient-Rptd) No    Intervention:  Advised to schedule with their dentist        Advanced Directives:  Do you have a Living Will?: (!) (Patient-Rptd) No    Intervention:  has NO advanced directive  - referred to ACP Coordinator                  Immunization History   Administered Date(s) Administered    COVID-19, MODERNA BLUE border, Primary or Immunocompromised, (age 12y+), IM, 100 mcg/0.5mL

## 2025-03-14 ENCOUNTER — CLINICAL DOCUMENTATION (OUTPATIENT)
Dept: SPIRITUAL SERVICES | Age: 75
End: 2025-03-14

## 2025-03-14 NOTE — PROGRESS NOTES
Advance Care Planning   Ambulatory ACP Specialist Patient Outreach    Date:  3/14/2025    ACP Specialist:  Kerry Acevedo    Outreach call to patient in follow-up to ACP Specialist referral from:Alek Tom MD    [x] PCP  [] Provider   [] Ambulatory Care Management [] Other     For:                  [x] Advance Directive Assistance              [] Complete Portable DNR order              [] Complete POST/POLST/MOST              [] Code Status Discussion             [] Discuss Goals of Care             [] Early ACP Decision-Making              [] Other (Specify)    Date Referral Received: 3/13/25    Next Step:   [x] ACP scheduled conversation  [] Outreach again in one week               [x] Email / Mail ACP Info Sheets  [x] Email / Mail Advance Directive   [] Closing referral.  Routing closure to referring provider/staff and to ACP Specialist .    [] Closure letter mailed to patient with invitation to contact ACP Specialist if / when ready.   [] Other (Specify here):       [x] At this time, Healthcare Decision Maker Is:   Primary Decision Maker: Marlyn England - Spouse - [phone]    Secondary Decision Maker: Morenita Fuchs - Child - [phone]         [] Primary agent named in scanned advance directive.    [x] Legal Next of Kin.     [] Unable to determine legal decision maker at this time.    Outreaches:         [x] 1st -  Date:  3/14/25               Intervention:  [x] Spoke with Patient   [] Left Voice mail [] Email / Mail    [] MyChart  [] Other (Specify) :     Outcomes:  Spoke with patient on mobile phone number which is also listed as the home number scheduling a telephone conversation with ACP Specialist Dev Duron on Friday, 3/21/25 at 9:00 a.m.         [] 2nd -  Date:                 Intervention:  [] Spoke with Patient  [] Left Voice mail [] Email / Mail    [] MyChart  [] Other (Specify) :              Outcomes:                [] 3rd -  Date:                Intervention:  []

## 2025-03-18 ENCOUNTER — RESULTS FOLLOW-UP (OUTPATIENT)
Dept: FAMILY MEDICINE CLINIC | Facility: CLINIC | Age: 75
End: 2025-03-18

## 2025-03-18 DIAGNOSIS — R35.1 NOCTURIA: ICD-10-CM

## 2025-03-18 DIAGNOSIS — I10 ESSENTIAL HYPERTENSION: ICD-10-CM

## 2025-03-18 DIAGNOSIS — E78.00 PURE HYPERCHOLESTEROLEMIA: ICD-10-CM

## 2025-03-18 LAB
ALBUMIN SERPL-MCNC: 3.8 G/DL (ref 3.2–4.6)
ALBUMIN/GLOB SERPL: 1.3 (ref 1–1.9)
ALP SERPL-CCNC: 66 U/L (ref 40–129)
ALT SERPL-CCNC: 26 U/L (ref 8–55)
ANION GAP SERPL CALC-SCNC: 10 MMOL/L (ref 7–16)
AST SERPL-CCNC: 30 U/L (ref 15–37)
BASOPHILS # BLD: 0.03 K/UL (ref 0–0.2)
BASOPHILS NFR BLD: 0.6 % (ref 0–2)
BILIRUB SERPL-MCNC: 0.9 MG/DL (ref 0–1.2)
BUN SERPL-MCNC: 18 MG/DL (ref 8–23)
CALCIUM SERPL-MCNC: 9.4 MG/DL (ref 8.8–10.2)
CHLORIDE SERPL-SCNC: 105 MMOL/L (ref 98–107)
CHOLEST SERPL-MCNC: 142 MG/DL (ref 0–200)
CO2 SERPL-SCNC: 28 MMOL/L (ref 20–29)
CREAT SERPL-MCNC: 1.12 MG/DL (ref 0.8–1.3)
DIFFERENTIAL METHOD BLD: NORMAL
EOSINOPHIL # BLD: 0.25 K/UL (ref 0–0.8)
EOSINOPHIL NFR BLD: 4.9 % (ref 0.5–7.8)
ERYTHROCYTE [DISTWIDTH] IN BLOOD BY AUTOMATED COUNT: 12.9 % (ref 11.9–14.6)
GLOBULIN SER CALC-MCNC: 2.9 G/DL (ref 2.3–3.5)
GLUCOSE SERPL-MCNC: 102 MG/DL (ref 70–99)
HCT VFR BLD AUTO: 42 % (ref 41.1–50.3)
HDLC SERPL-MCNC: 53 MG/DL (ref 40–60)
HDLC SERPL: 2.7 (ref 0–5)
HGB BLD-MCNC: 14.1 G/DL (ref 13.6–17.2)
IMM GRANULOCYTES # BLD AUTO: 0 K/UL (ref 0–0.5)
IMM GRANULOCYTES NFR BLD AUTO: 0 % (ref 0–5)
LDLC SERPL CALC-MCNC: 76 MG/DL (ref 0–100)
LYMPHOCYTES # BLD: 1.6 K/UL (ref 0.5–4.6)
LYMPHOCYTES NFR BLD: 31.1 % (ref 13–44)
MCH RBC QN AUTO: 31.6 PG (ref 26.1–32.9)
MCHC RBC AUTO-ENTMCNC: 33.6 G/DL (ref 31.4–35)
MCV RBC AUTO: 94.2 FL (ref 82–102)
MONOCYTES # BLD: 0.52 K/UL (ref 0.1–1.3)
MONOCYTES NFR BLD: 10.1 % (ref 4–12)
NEUTS SEG # BLD: 2.74 K/UL (ref 1.7–8.2)
NEUTS SEG NFR BLD: 53.3 % (ref 43–78)
NRBC # BLD: 0 K/UL (ref 0–0.2)
PLATELET # BLD AUTO: 189 K/UL (ref 150–450)
PMV BLD AUTO: 10.3 FL (ref 9.4–12.3)
POTASSIUM SERPL-SCNC: 4.2 MMOL/L (ref 3.5–5.1)
PROT SERPL-MCNC: 6.7 G/DL (ref 6.3–8.2)
PSA SERPL-MCNC: 0.2 NG/ML (ref 0–4)
RBC # BLD AUTO: 4.46 M/UL (ref 4.23–5.6)
SODIUM SERPL-SCNC: 143 MMOL/L (ref 136–145)
TRIGL SERPL-MCNC: 62 MG/DL (ref 0–150)
VLDLC SERPL CALC-MCNC: 12 MG/DL (ref 6–23)
WBC # BLD AUTO: 5.1 K/UL (ref 4.3–11.1)

## 2025-03-21 ENCOUNTER — CLINICAL DOCUMENTATION (OUTPATIENT)
Dept: CARE COORDINATION | Facility: CLINIC | Age: 75
End: 2025-03-21

## 2025-03-21 NOTE — ACP (ADVANCE CARE PLANNING)
Advance Care Planning   Ambulatory ACP Specialist Patient Outreach    Date:  3/21/2025    ACP Specialist:  SANDRO ROCHA    Outreach call to patient in follow-up to ACP Specialist referral from:Alek Tom MD    [x] PCP  [] Provider   [] Ambulatory Care Management [] Other     For:                  [x] Advance Directive Assistance              [] Complete Portable DNR order              [] Complete POST/POLST/MOST              [] Code Status Discussion             [] Discuss Goals of Care             [x] Early ACP Decision-Making              [x] Other (Specify)    Date Referral Received:3/14/2025    Next Step:   [] ACP scheduled conversation  [] Outreach again in one week               [] Email / Mail ACP Info Sheets  [] Email / Mail Advance Directive   [] Closing referral.  Routing closure to referring provider/staff and to ACP Specialist .    [] Closure letter mailed to patient with invitation to contact ACP Specialist if / when ready.   [x] Other (Specify here):       [x] At this time, Healthcare Decision Maker Is:         [] Primary agent named in scanned advance directive.    [x] Legal Next of Kin.     [] Unable to determine legal decision maker at this time.    Outreaches:       [] 1st -  Date:                 Intervention:  [] Spoke with Patient   [] Left Voice mail [] Email / Mail    [] MyChart  [] Other (Specify) :     Outcomes:           [] 2nd -  Date:                 Intervention:  [] Spoke with Patient  [] Left Voice mail [] Email / Mail    [] MyChart  [] Other (Specify) :              Outcomes:                [] 3rd -  Date:                Intervention:  [] Spoke with Patient   [] Left Voice mail [] Email / Mail    [] MyChart  [] Other (Specify) :       Outcomes:           [x]  Additional Outreach -  Date:  3/21/2025    (Specify Dates & special circumstances):    Outcomes: Called the patient at the appointed time at the directed number with no answer at 15 rings. Called

## 2025-03-28 ENCOUNTER — CLINICAL DOCUMENTATION (OUTPATIENT)
Dept: CARE COORDINATION | Facility: CLINIC | Age: 75
End: 2025-03-28

## 2025-04-03 ENCOUNTER — CLINICAL DOCUMENTATION (OUTPATIENT)
Dept: CARE COORDINATION | Facility: CLINIC | Age: 75
End: 2025-04-03

## 2025-05-22 ENCOUNTER — OFFICE VISIT (OUTPATIENT)
Age: 75
End: 2025-05-22
Payer: MEDICARE

## 2025-05-22 VITALS
SYSTOLIC BLOOD PRESSURE: 112 MMHG | BODY MASS INDEX: 34.4 KG/M2 | HEIGHT: 68 IN | DIASTOLIC BLOOD PRESSURE: 70 MMHG | HEART RATE: 84 BPM | WEIGHT: 227 LBS

## 2025-05-22 DIAGNOSIS — I50.32 CHRONIC DIASTOLIC CONGESTIVE HEART FAILURE (HCC): ICD-10-CM

## 2025-05-22 DIAGNOSIS — I48.21 PERMANENT ATRIAL FIBRILLATION (HCC): ICD-10-CM

## 2025-05-22 DIAGNOSIS — I10 ESSENTIAL HYPERTENSION: ICD-10-CM

## 2025-05-22 DIAGNOSIS — I35.8 NONRHEUMATIC AORTIC VALVE SCLEROSIS: ICD-10-CM

## 2025-05-22 DIAGNOSIS — Z95.0 CARDIAC PACEMAKER: Primary | ICD-10-CM

## 2025-05-22 PROCEDURE — 3017F COLORECTAL CA SCREEN DOC REV: CPT | Performed by: INTERNAL MEDICINE

## 2025-05-22 PROCEDURE — 4004F PT TOBACCO SCREEN RCVD TLK: CPT | Performed by: INTERNAL MEDICINE

## 2025-05-22 PROCEDURE — 99214 OFFICE O/P EST MOD 30 MIN: CPT | Performed by: INTERNAL MEDICINE

## 2025-05-22 PROCEDURE — 3074F SYST BP LT 130 MM HG: CPT | Performed by: INTERNAL MEDICINE

## 2025-05-22 PROCEDURE — 1159F MED LIST DOCD IN RCRD: CPT | Performed by: INTERNAL MEDICINE

## 2025-05-22 PROCEDURE — 1123F ACP DISCUSS/DSCN MKR DOCD: CPT | Performed by: INTERNAL MEDICINE

## 2025-05-22 PROCEDURE — G8417 CALC BMI ABV UP PARAM F/U: HCPCS | Performed by: INTERNAL MEDICINE

## 2025-05-22 PROCEDURE — G8427 DOCREV CUR MEDS BY ELIG CLIN: HCPCS | Performed by: INTERNAL MEDICINE

## 2025-05-22 PROCEDURE — 1160F RVW MEDS BY RX/DR IN RCRD: CPT | Performed by: INTERNAL MEDICINE

## 2025-05-22 PROCEDURE — 3078F DIAST BP <80 MM HG: CPT | Performed by: INTERNAL MEDICINE

## 2025-05-22 PROCEDURE — 1126F AMNT PAIN NOTED NONE PRSNT: CPT | Performed by: INTERNAL MEDICINE

## 2025-05-22 RX ORDER — AMLODIPINE BESYLATE 10 MG/1
10 TABLET ORAL DAILY
Qty: 90 TABLET | Refills: 3 | Status: SHIPPED | OUTPATIENT
Start: 2025-05-22

## 2025-05-22 RX ORDER — FUROSEMIDE 40 MG/1
TABLET ORAL
Qty: 180 TABLET | Refills: 3 | Status: SHIPPED | OUTPATIENT
Start: 2025-05-22

## 2025-05-22 ASSESSMENT — ENCOUNTER SYMPTOMS
SPUTUM PRODUCTION: 0
HOARSE VOICE: 0
DIARRHEA: 0
SHORTNESS OF BREATH: 0
BLURRED VISION: 0
COLOR CHANGE: 0
WHEEZING: 0
HEMATEMESIS: 0
BOWEL INCONTINENCE: 0
ABDOMINAL PAIN: 0
HEMATOCHEZIA: 0
ORTHOPNEA: 0

## 2025-05-22 NOTE — PROGRESS NOTES
furosemide (LASIX) 40 MG tablet; Take 80 mg on Tuesday and Friday, take 40 mg the rest of the week  -     amLODIPine (NORVASC) 10 MG tablet; Take 1 tablet by mouth daily    Permanent atrial fibrillation (HCC): Stable.  Continue Eliquis for stroke risk reduction.  -     apixaban (ELIQUIS) 5 MG TABS tablet; Take 1 tablet by mouth 2 times daily    Nonrheumatic aortic valve sclerosis: Cardiac murmur remains unchanged.  Consider follow-up echo in 1 to 2 years to exclude progression of aortic sclerosis to aortic stenosis.          Disposition:    Return in about 6 months (around 11/22/2025).      No follow-up provider specified.          AKIN LEMON MD  5/22/2025  3:36 PM

## 2025-07-15 PROCEDURE — 93296 REM INTERROG EVL PM/IDS: CPT | Performed by: INTERNAL MEDICINE

## 2025-07-15 PROCEDURE — 93294 REM INTERROG EVL PM/LDLS PM: CPT | Performed by: INTERNAL MEDICINE

## (undated) DEVICE — GUIDE NDL ST W/ 14 X 147CM TELESCOPICALLY FLD CIV FLX CVR

## (undated) DEVICE — INTRODUCER SHTH L13CM OD7FR SH ORNG HUB SEAMLESS SAFSHTH

## (undated) DEVICE — SUTURE TICRON SZ 0 L36IN NONABSORBABLE BLU CV 300 L35MM 1 2 8886339361

## (undated) DEVICE — SUTURE VCRL SZ 3-0 L27IN ABSRB UD L26MM SH 1/2 CIR J416H

## (undated) DEVICE — Device

## (undated) DEVICE — DRESSING POSTOP AG PRISMASEAL 3.5X6IN

## (undated) DEVICE — ADHESIVE SKIN CLSR 0.7ML TOP DERMBND ADV

## (undated) DEVICE — SUTURE VCRL + SZ 4-0 L27IN ABSRB UD PS-2 3/8 CIR REV CUT VCP426H

## (undated) DEVICE — IMMOBILIZER SHLDR M L8X16.5IN R/L CANVS W/ WAIST STRP THMB

## (undated) DEVICE — SUTURE ABSORBABLE BRAIDED 2-0 CT-1 27 IN UD VICRYL J259H